# Patient Record
Sex: MALE | Race: WHITE | NOT HISPANIC OR LATINO | Employment: FULL TIME | URBAN - METROPOLITAN AREA
[De-identification: names, ages, dates, MRNs, and addresses within clinical notes are randomized per-mention and may not be internally consistent; named-entity substitution may affect disease eponyms.]

---

## 2017-03-02 ENCOUNTER — HOSPITAL ENCOUNTER (EMERGENCY)
Facility: HOSPITAL | Age: 37
Discharge: HOME | End: 2017-03-02
Attending: EMERGENCY MEDICINE | Admitting: EMERGENCY MEDICINE
Payer: OTHER MISCELLANEOUS

## 2017-03-02 VITALS
TEMPERATURE: 98.8 F | HEIGHT: 68 IN | SYSTOLIC BLOOD PRESSURE: 131 MMHG | HEART RATE: 74 BPM | BODY MASS INDEX: 33.34 KG/M2 | DIASTOLIC BLOOD PRESSURE: 80 MMHG | WEIGHT: 220 LBS | RESPIRATION RATE: 16 BRPM | OXYGEN SATURATION: 96 %

## 2017-03-02 DIAGNOSIS — S61.019A THUMB LACERATION: Primary | ICD-10-CM

## 2017-03-02 PROCEDURE — 90715 TDAP VACCINE 7 YRS/> IM: CPT | Performed by: EMERGENCY MEDICINE

## 2017-03-02 PROCEDURE — 99282 EMERGENCY DEPT VISIT SF MDM: CPT

## 2017-03-02 PROCEDURE — 90471 IMMUNIZATION ADMIN: CPT

## 2017-03-02 RX ORDER — ACETAMINOPHEN 325 MG/1
650 TABLET ORAL ONCE
Status: COMPLETED | OUTPATIENT
Start: 2017-03-02 | End: 2017-03-02

## 2017-03-02 RX ORDER — HYDROCODONE BITARTRATE AND ACETAMINOPHEN 5; 325 MG/1; MG/1
1 TABLET ORAL EVERY 8 HOURS PRN
Qty: 3 TABLET | Refills: 0 | Status: SHIPPED | OUTPATIENT
Start: 2017-03-02 | End: 2017-03-05

## 2017-03-02 RX ORDER — DIAPER,BRIEF,INFANT-TODD,DISP
1 EACH MISCELLANEOUS 2 TIMES DAILY
Qty: 14 G | Refills: 0 | Status: SHIPPED | OUTPATIENT
Start: 2017-03-02 | End: 2017-04-17 | Stop reason: HOSPADM

## 2017-03-02 RX ORDER — GINSENG 100 MG
1 CAPSULE ORAL ONCE
Status: COMPLETED | OUTPATIENT
Start: 2017-03-02 | End: 2017-03-02

## 2017-03-02 RX ORDER — LIDOCAINE HYDROCHLORIDE 10 MG/ML
10 INJECTION, SOLUTION EPIDURAL; INFILTRATION; INTRACAUDAL; PERINEURAL ONCE
Status: COMPLETED | OUTPATIENT
Start: 2017-03-02 | End: 2017-03-02

## 2017-03-02 RX ADMIN — TETANUS TOXOID, REDUCED DIPHTHERIA TOXOID AND ACELLULAR PERTUSSIS VACCINE, ADSORBED 0.5 ML: 5; 2.5; 8; 8; 2.5 SUSPENSION INTRAMUSCULAR at 01:49

## 2017-03-02 RX ADMIN — BACITRACIN ZINC 1 SMALL APPLICATION: 500 OINTMENT TOPICAL at 01:49

## 2017-03-02 RX ADMIN — ACETAMINOPHEN 650 MG: 325 TABLET, FILM COATED ORAL at 01:48

## 2017-03-02 RX ADMIN — LIDOCAINE HYDROCHLORIDE 10 ML: 10 INJECTION, SOLUTION EPIDURAL; INFILTRATION; INTRACAUDAL; PERINEURAL at 01:49

## 2017-04-17 ENCOUNTER — ANESTHESIA (OUTPATIENT)
Dept: PERIOP | Facility: HOSPITAL | Age: 37
End: 2017-04-17
Payer: COMMERCIAL

## 2017-04-17 ENCOUNTER — ANESTHESIA EVENT (OUTPATIENT)
Dept: PERIOP | Facility: HOSPITAL | Age: 37
End: 2017-04-17
Payer: COMMERCIAL

## 2017-04-17 ENCOUNTER — APPOINTMENT (EMERGENCY)
Dept: CT IMAGING | Facility: HOSPITAL | Age: 37
End: 2017-04-17
Payer: COMMERCIAL

## 2017-04-17 ENCOUNTER — HOSPITAL ENCOUNTER (OUTPATIENT)
Facility: HOSPITAL | Age: 37
Setting detail: OBSERVATION
Discharge: HOME | End: 2017-04-17
Attending: EMERGENCY MEDICINE | Admitting: SURGERY
Payer: COMMERCIAL

## 2017-04-17 VITALS
HEIGHT: 68 IN | HEART RATE: 86 BPM | TEMPERATURE: 98.3 F | WEIGHT: 227 LBS | SYSTOLIC BLOOD PRESSURE: 120 MMHG | BODY MASS INDEX: 34.4 KG/M2 | OXYGEN SATURATION: 97 % | RESPIRATION RATE: 18 BRPM | DIASTOLIC BLOOD PRESSURE: 70 MMHG

## 2017-04-17 DIAGNOSIS — K37 APPENDICITIS: Primary | ICD-10-CM

## 2017-04-17 DIAGNOSIS — R10.31 RIGHT LOWER QUADRANT ABDOMINAL PAIN: ICD-10-CM

## 2017-04-17 PROBLEM — K35.80 APPENDICITIS, ACUTE: Status: ACTIVE | Noted: 2017-04-17

## 2017-04-17 LAB
ALBUMIN SERPL BCP-MCNC: 4.2 G/DL (ref 3.5–5)
ALP SERPL-CCNC: 119 U/L (ref 46–116)
ALT SERPL W P-5'-P-CCNC: 40 U/L (ref 12–78)
ANION GAP SERPL CALCULATED.3IONS-SCNC: 8 MMOL/L (ref 4–13)
APTT PPP: 28 SECONDS (ref 24–36)
AST SERPL W P-5'-P-CCNC: 15 U/L (ref 5–45)
ATRIAL RATE: 69 BPM
BACTERIA UR QL AUTO: NORMAL /HPF
BASOPHILS # BLD AUTO: 0.03 THOUSANDS/ΜL (ref 0–0.1)
BASOPHILS NFR BLD AUTO: 0 % (ref 0–1)
BILIRUB SERPL-MCNC: 0.4 MG/DL (ref 0.2–1)
BILIRUB UR QL STRIP: NEGATIVE
BUN SERPL-MCNC: 15 MG/DL (ref 5–25)
CALCIUM SERPL-MCNC: 9 MG/DL (ref 8.3–10.1)
CHLORIDE SERPL-SCNC: 103 MMOL/L (ref 100–108)
CLARITY UR: CLEAR
CO2 SERPL-SCNC: 29 MMOL/L (ref 21–32)
COLOR UR: YELLOW
CREAT SERPL-MCNC: 0.84 MG/DL (ref 0.6–1.3)
EOSINOPHIL # BLD AUTO: 0.16 THOUSAND/ΜL (ref 0–0.61)
EOSINOPHIL NFR BLD AUTO: 1 % (ref 0–6)
ERYTHROCYTE [DISTWIDTH] IN BLOOD BY AUTOMATED COUNT: 13.6 % (ref 11.6–15.1)
GFR SERPL CREATININE-BSD FRML MDRD: >60 ML/MIN/1.73SQ M
GLUCOSE SERPL-MCNC: 111 MG/DL (ref 65–140)
GLUCOSE UR STRIP-MCNC: NEGATIVE MG/DL
HCT VFR BLD AUTO: 45.7 % (ref 36.5–49.3)
HGB BLD-MCNC: 15.8 G/DL (ref 12–17)
HGB UR QL STRIP.AUTO: ABNORMAL
INR PPP: 1.04 (ref 0.86–1.16)
KETONES UR STRIP-MCNC: NEGATIVE MG/DL
LACTATE SERPL-SCNC: 1 MMOL/L (ref 0.5–2)
LEUKOCYTE ESTERASE UR QL STRIP: NEGATIVE
LIPASE SERPL-CCNC: 108 U/L (ref 73–393)
LYMPHOCYTES # BLD AUTO: 2.02 THOUSANDS/ΜL (ref 0.6–4.47)
LYMPHOCYTES NFR BLD AUTO: 17 % (ref 14–44)
MCH RBC QN AUTO: 28.7 PG (ref 26.8–34.3)
MCHC RBC AUTO-ENTMCNC: 34.6 G/DL (ref 31.4–37.4)
MCV RBC AUTO: 83 FL (ref 82–98)
MONOCYTES # BLD AUTO: 0.94 THOUSAND/ΜL (ref 0.17–1.22)
MONOCYTES NFR BLD AUTO: 8 % (ref 4–12)
NEUTROPHILS # BLD AUTO: 8.64 THOUSANDS/ΜL (ref 1.85–7.62)
NEUTS SEG NFR BLD AUTO: 74 % (ref 43–75)
NITRITE UR QL STRIP: NEGATIVE
NON-SQ EPI CELLS URNS QL MICRO: NORMAL /HPF
P AXIS: 18 DEGREES
PH UR STRIP.AUTO: 7 [PH] (ref 4.5–8)
PLATELET # BLD AUTO: 156 THOUSANDS/UL (ref 149–390)
PMV BLD AUTO: 11.1 FL (ref 8.9–12.7)
POTASSIUM SERPL-SCNC: 3.8 MMOL/L (ref 3.5–5.3)
PR INTERVAL: 150 MS
PROT SERPL-MCNC: 7.5 G/DL (ref 6.4–8.2)
PROT UR STRIP-MCNC: NEGATIVE MG/DL
PROTHROMBIN TIME: 13.4 SECONDS (ref 12–14.3)
QRS AXIS: -16 DEGREES
QRSD INTERVAL: 114 MS
QT INTERVAL: 390 MS
QTC INTERVAL: 409 MS
RBC # BLD AUTO: 5.5 MILLION/UL (ref 3.88–5.62)
RBC #/AREA URNS AUTO: NORMAL /HPF
SODIUM SERPL-SCNC: 140 MMOL/L (ref 136–145)
SP GR UR STRIP.AUTO: 1.01 (ref 1–1.03)
T WAVE AXIS: 11 DEGREES
UROBILINOGEN UR QL STRIP.AUTO: 1 E.U./DL
VENTRICULAR RATE: 66 BPM
WBC # BLD AUTO: 11.79 THOUSAND/UL (ref 4.31–10.16)
WBC #/AREA URNS AUTO: NORMAL /HPF

## 2017-04-17 PROCEDURE — 93005 ELECTROCARDIOGRAM TRACING: CPT | Performed by: EMERGENCY MEDICINE

## 2017-04-17 PROCEDURE — 85730 THROMBOPLASTIN TIME PARTIAL: CPT | Performed by: EMERGENCY MEDICINE

## 2017-04-17 PROCEDURE — 85610 PROTHROMBIN TIME: CPT | Performed by: EMERGENCY MEDICINE

## 2017-04-17 PROCEDURE — 74177 CT ABD & PELVIS W/CONTRAST: CPT

## 2017-04-17 PROCEDURE — 36415 COLL VENOUS BLD VENIPUNCTURE: CPT | Performed by: EMERGENCY MEDICINE

## 2017-04-17 PROCEDURE — 87086 URINE CULTURE/COLONY COUNT: CPT | Performed by: EMERGENCY MEDICINE

## 2017-04-17 PROCEDURE — 83605 ASSAY OF LACTIC ACID: CPT | Performed by: EMERGENCY MEDICINE

## 2017-04-17 PROCEDURE — 96375 TX/PRO/DX INJ NEW DRUG ADDON: CPT

## 2017-04-17 PROCEDURE — 85025 COMPLETE CBC W/AUTO DIFF WBC: CPT | Performed by: EMERGENCY MEDICINE

## 2017-04-17 PROCEDURE — 99285 EMERGENCY DEPT VISIT HI MDM: CPT

## 2017-04-17 PROCEDURE — 80053 COMPREHEN METABOLIC PANEL: CPT | Performed by: EMERGENCY MEDICINE

## 2017-04-17 PROCEDURE — 81001 URINALYSIS AUTO W/SCOPE: CPT | Performed by: EMERGENCY MEDICINE

## 2017-04-17 PROCEDURE — 96374 THER/PROPH/DIAG INJ IV PUSH: CPT

## 2017-04-17 PROCEDURE — 88304 TISSUE EXAM BY PATHOLOGIST: CPT | Performed by: SURGERY

## 2017-04-17 PROCEDURE — 83690 ASSAY OF LIPASE: CPT | Performed by: EMERGENCY MEDICINE

## 2017-04-17 PROCEDURE — 96361 HYDRATE IV INFUSION ADD-ON: CPT

## 2017-04-17 RX ORDER — LIDOCAINE HYDROCHLORIDE 10 MG/ML
INJECTION, SOLUTION INFILTRATION; PERINEURAL AS NEEDED
Status: DISCONTINUED | OUTPATIENT
Start: 2017-04-17 | End: 2017-04-17 | Stop reason: SURG

## 2017-04-17 RX ORDER — PROPOFOL 10 MG/ML
INJECTION, EMULSION INTRAVENOUS AS NEEDED
Status: DISCONTINUED | OUTPATIENT
Start: 2017-04-17 | End: 2017-04-17 | Stop reason: SURG

## 2017-04-17 RX ORDER — ONDANSETRON 2 MG/ML
4 INJECTION INTRAMUSCULAR; INTRAVENOUS ONCE
Status: COMPLETED | OUTPATIENT
Start: 2017-04-17 | End: 2017-04-17

## 2017-04-17 RX ORDER — ONDANSETRON 2 MG/ML
INJECTION INTRAMUSCULAR; INTRAVENOUS AS NEEDED
Status: DISCONTINUED | OUTPATIENT
Start: 2017-04-17 | End: 2017-04-17 | Stop reason: SURG

## 2017-04-17 RX ORDER — ACETAMINOPHEN 325 MG/1
650 TABLET ORAL EVERY 4 HOURS PRN
Status: DISCONTINUED | OUTPATIENT
Start: 2017-04-17 | End: 2017-04-17 | Stop reason: HOSPADM

## 2017-04-17 RX ORDER — OXYCODONE HYDROCHLORIDE AND ACETAMINOPHEN 5; 325 MG/1; MG/1
2 TABLET ORAL EVERY 4 HOURS PRN
Qty: 28 TABLET | Refills: 0 | Status: SHIPPED | OUTPATIENT
Start: 2017-04-17 | End: 2019-12-31 | Stop reason: ALTCHOICE

## 2017-04-17 RX ORDER — MIDAZOLAM HYDROCHLORIDE 1 MG/ML
INJECTION INTRAMUSCULAR; INTRAVENOUS
Status: COMPLETED
Start: 2017-04-17 | End: 2017-04-17

## 2017-04-17 RX ORDER — SODIUM CHLORIDE, SODIUM LACTATE, POTASSIUM CHLORIDE, CALCIUM CHLORIDE 600; 310; 30; 20 MG/100ML; MG/100ML; MG/100ML; MG/100ML
125 INJECTION, SOLUTION INTRAVENOUS CONTINUOUS
Status: DISCONTINUED | OUTPATIENT
Start: 2017-04-17 | End: 2017-04-17 | Stop reason: SDUPTHER

## 2017-04-17 RX ORDER — GLYCOPYRROLATE 0.2 MG/ML
INJECTION INTRAMUSCULAR; INTRAVENOUS AS NEEDED
Status: DISCONTINUED | OUTPATIENT
Start: 2017-04-17 | End: 2017-04-17 | Stop reason: SURG

## 2017-04-17 RX ORDER — KETOROLAC TROMETHAMINE 30 MG/ML
INJECTION, SOLUTION INTRAMUSCULAR; INTRAVENOUS AS NEEDED
Status: DISCONTINUED | OUTPATIENT
Start: 2017-04-17 | End: 2017-04-17 | Stop reason: SURG

## 2017-04-17 RX ORDER — ONDANSETRON 2 MG/ML
4 INJECTION INTRAMUSCULAR; INTRAVENOUS ONCE
Status: DISCONTINUED | OUTPATIENT
Start: 2017-04-17 | End: 2017-04-17 | Stop reason: HOSPADM

## 2017-04-17 RX ORDER — OXYCODONE HYDROCHLORIDE AND ACETAMINOPHEN 5; 325 MG/1; MG/1
2 TABLET ORAL EVERY 4 HOURS PRN
Status: DISCONTINUED | OUTPATIENT
Start: 2017-04-17 | End: 2017-04-17 | Stop reason: HOSPADM

## 2017-04-17 RX ORDER — CEFAZOLIN SODIUM 1 G/3ML
INJECTION, POWDER, FOR SOLUTION INTRAMUSCULAR; INTRAVENOUS AS NEEDED
Status: DISCONTINUED | OUTPATIENT
Start: 2017-04-17 | End: 2017-04-17 | Stop reason: SURG

## 2017-04-17 RX ORDER — ROCURONIUM BROMIDE 10 MG/ML
INJECTION, SOLUTION INTRAVENOUS AS NEEDED
Status: DISCONTINUED | OUTPATIENT
Start: 2017-04-17 | End: 2017-04-17 | Stop reason: SURG

## 2017-04-17 RX ORDER — FENTANYL CITRATE/PF 50 MCG/ML
25 SYRINGE (ML) INJECTION
Status: DISCONTINUED | OUTPATIENT
Start: 2017-04-17 | End: 2017-04-17 | Stop reason: HOSPADM

## 2017-04-17 RX ORDER — SODIUM CHLORIDE, SODIUM LACTATE, POTASSIUM CHLORIDE, CALCIUM CHLORIDE 600; 310; 30; 20 MG/100ML; MG/100ML; MG/100ML; MG/100ML
125 INJECTION, SOLUTION INTRAVENOUS CONTINUOUS
Status: DISCONTINUED | OUTPATIENT
Start: 2017-04-17 | End: 2017-04-17 | Stop reason: HOSPADM

## 2017-04-17 RX ORDER — FENTANYL CITRATE 50 UG/ML
INJECTION, SOLUTION INTRAMUSCULAR; INTRAVENOUS
Status: COMPLETED
Start: 2017-04-17 | End: 2017-04-17

## 2017-04-17 RX ORDER — OXYCODONE HYDROCHLORIDE AND ACETAMINOPHEN 5; 325 MG/1; MG/1
1 TABLET ORAL EVERY 4 HOURS PRN
Status: DISCONTINUED | OUTPATIENT
Start: 2017-04-17 | End: 2017-04-17

## 2017-04-17 RX ORDER — ONDANSETRON 2 MG/ML
4 INJECTION INTRAMUSCULAR; INTRAVENOUS EVERY 4 HOURS PRN
Status: DISCONTINUED | OUTPATIENT
Start: 2017-04-17 | End: 2017-04-17 | Stop reason: SDUPTHER

## 2017-04-17 RX ORDER — BUPIVACAINE HYDROCHLORIDE AND EPINEPHRINE 2.5; 5 MG/ML; UG/ML
INJECTION, SOLUTION EPIDURAL; INFILTRATION; INTRACAUDAL; PERINEURAL AS NEEDED
Status: DISCONTINUED | OUTPATIENT
Start: 2017-04-17 | End: 2017-04-17 | Stop reason: HOSPADM

## 2017-04-17 RX ORDER — SODIUM CHLORIDE 9 MG/ML
125 INJECTION, SOLUTION INTRAVENOUS CONTINUOUS
Status: DISCONTINUED | OUTPATIENT
Start: 2017-04-17 | End: 2017-04-17

## 2017-04-17 RX ORDER — SUCCINYLCHOLINE/SOD CL,ISO/PF 100 MG/5ML
SYRINGE (ML) INTRAVENOUS AS NEEDED
Status: DISCONTINUED | OUTPATIENT
Start: 2017-04-17 | End: 2017-04-17 | Stop reason: SURG

## 2017-04-17 RX ORDER — ONDANSETRON 2 MG/ML
4 INJECTION INTRAMUSCULAR; INTRAVENOUS EVERY 4 HOURS PRN
Status: DISCONTINUED | OUTPATIENT
Start: 2017-04-17 | End: 2017-04-17 | Stop reason: HOSPADM

## 2017-04-17 RX ADMIN — CEFAZOLIN SODIUM 1000 MG: 1 SOLUTION INTRAVENOUS at 12:16

## 2017-04-17 RX ADMIN — DEXAMETHASONE SODIUM PHOSPHATE 8 MG: 10 INJECTION INTRAMUSCULAR; INTRAVENOUS at 08:46

## 2017-04-17 RX ADMIN — METRONIDAZOLE 500 MG: 500 INJECTION, SOLUTION INTRAVENOUS at 11:20

## 2017-04-17 RX ADMIN — MIDAZOLAM HYDROCHLORIDE 2 MG: 1 INJECTION, SOLUTION INTRAMUSCULAR; INTRAVENOUS at 08:38

## 2017-04-17 RX ADMIN — IOHEXOL 100 ML: 350 INJECTION, SOLUTION INTRAVENOUS at 03:00

## 2017-04-17 RX ADMIN — LIDOCAINE HYDROCHLORIDE 50 MG: 10 INJECTION, SOLUTION INFILTRATION; PERINEURAL at 08:41

## 2017-04-17 RX ADMIN — GLYCOPYRROLATE 0.8 MG: 0.2 INJECTION INTRAMUSCULAR; INTRAVENOUS at 09:14

## 2017-04-17 RX ADMIN — FAMOTIDINE 20 MG: 10 INJECTION, SOLUTION INTRAVENOUS at 01:41

## 2017-04-17 RX ADMIN — Medication 180 MG: at 08:40

## 2017-04-17 RX ADMIN — ROCURONIUM BROMIDE 30 MG: 10 INJECTION, SOLUTION INTRAVENOUS at 08:52

## 2017-04-17 RX ADMIN — HYDROMORPHONE HYDROCHLORIDE 1 MG: 1 INJECTION, SOLUTION INTRAMUSCULAR; INTRAVENOUS; SUBCUTANEOUS at 05:33

## 2017-04-17 RX ADMIN — CEFAZOLIN SODIUM 2000 MG: 1 POWDER, FOR SOLUTION INTRAMUSCULAR; INTRAVENOUS at 08:47

## 2017-04-17 RX ADMIN — SODIUM CHLORIDE 1000 ML: 0.9 INJECTION, SOLUTION INTRAVENOUS at 01:35

## 2017-04-17 RX ADMIN — SODIUM CHLORIDE, SODIUM LACTATE, POTASSIUM CHLORIDE, AND CALCIUM CHLORIDE 125 ML/HR: .6; .31; .03; .02 INJECTION, SOLUTION INTRAVENOUS at 04:20

## 2017-04-17 RX ADMIN — METRONIDAZOLE 500 MG: 500 INJECTION, SOLUTION INTRAVENOUS at 04:23

## 2017-04-17 RX ADMIN — NEOSTIGMINE METHYLSULFATE 4 MG: 1 INJECTION INTRAMUSCULAR; INTRAVENOUS; SUBCUTANEOUS at 09:14

## 2017-04-17 RX ADMIN — ONDANSETRON 4 MG: 2 INJECTION INTRAMUSCULAR; INTRAVENOUS at 01:36

## 2017-04-17 RX ADMIN — METRONIDAZOLE 500 MG: 500 SOLUTION INTRAVENOUS at 08:45

## 2017-04-17 RX ADMIN — PROPOFOL 200 MG: 10 INJECTION, EMULSION INTRAVENOUS at 08:41

## 2017-04-17 RX ADMIN — SODIUM CHLORIDE, SODIUM LACTATE, POTASSIUM CHLORIDE, AND CALCIUM CHLORIDE: .6; .31; .03; .02 INJECTION, SOLUTION INTRAVENOUS at 08:38

## 2017-04-17 RX ADMIN — CEFAZOLIN SODIUM 1000 MG: 1 SOLUTION INTRAVENOUS at 05:33

## 2017-04-17 RX ADMIN — KETOROLAC TROMETHAMINE 30 MG: 30 INJECTION, SOLUTION INTRAMUSCULAR at 09:12

## 2017-04-17 RX ADMIN — OXYCODONE HYDROCHLORIDE AND ACETAMINOPHEN 2 TABLET: 5; 325 TABLET ORAL at 14:38

## 2017-04-17 RX ADMIN — ONDANSETRON 4 MG: 2 INJECTION INTRAMUSCULAR; INTRAVENOUS at 08:46

## 2017-04-17 RX ADMIN — HYDROMORPHONE HYDROCHLORIDE 0.5 MG: 1 INJECTION, SOLUTION INTRAMUSCULAR; INTRAVENOUS; SUBCUTANEOUS at 01:39

## 2017-04-17 RX ADMIN — IOHEXOL 50 ML: 240 INJECTION, SOLUTION INTRATHECAL; INTRAVASCULAR; INTRAVENOUS; ORAL at 01:15

## 2017-04-17 RX ADMIN — FENTANYL CITRATE 100 MCG: 50 INJECTION INTRAMUSCULAR; INTRAVENOUS at 08:38

## 2017-04-18 LAB — BACTERIA UR CULT: NORMAL

## 2018-05-01 ENCOUNTER — HOSPITAL ENCOUNTER (EMERGENCY)
Facility: HOSPITAL | Age: 38
Discharge: HOME | End: 2018-05-02
Attending: EMERGENCY MEDICINE | Admitting: EMERGENCY MEDICINE
Payer: OTHER MISCELLANEOUS

## 2018-05-01 ENCOUNTER — APPOINTMENT (EMERGENCY)
Dept: RADIOLOGY | Facility: HOSPITAL | Age: 38
End: 2018-05-01
Payer: OTHER MISCELLANEOUS

## 2018-05-01 VITALS
DIASTOLIC BLOOD PRESSURE: 86 MMHG | BODY MASS INDEX: 33.45 KG/M2 | SYSTOLIC BLOOD PRESSURE: 139 MMHG | TEMPERATURE: 98.1 F | WEIGHT: 220 LBS | RESPIRATION RATE: 18 BRPM | OXYGEN SATURATION: 97 % | HEART RATE: 65 BPM

## 2018-05-01 DIAGNOSIS — M79.641 RIGHT HAND PAIN: ICD-10-CM

## 2018-05-01 DIAGNOSIS — S60.511A ABRASION OF RIGHT HAND, INITIAL ENCOUNTER: ICD-10-CM

## 2018-05-01 DIAGNOSIS — S69.91XA HAND INJURY, RIGHT, INITIAL ENCOUNTER: Primary | ICD-10-CM

## 2018-05-01 PROCEDURE — 73110 X-RAY EXAM OF WRIST: CPT

## 2018-05-01 PROCEDURE — 73130 X-RAY EXAM OF HAND: CPT

## 2018-05-01 RX ORDER — NAPROXEN 500 MG/1
500 TABLET ORAL ONCE
Status: COMPLETED | OUTPATIENT
Start: 2018-05-02 | End: 2018-05-02

## 2018-05-01 RX ORDER — GINSENG 100 MG
1 CAPSULE ORAL ONCE
Status: COMPLETED | OUTPATIENT
Start: 2018-05-02 | End: 2018-05-02

## 2018-05-02 PROCEDURE — 99283 EMERGENCY DEPT VISIT LOW MDM: CPT

## 2018-05-02 RX ORDER — BACITRACIN, NEOMYCIN, POLYMYXIN B 400; 3.5; 5 [USP'U]/G; MG/G; [USP'U]/G
OINTMENT TOPICAL 2 TIMES DAILY
Qty: 15 G | Refills: 0 | Status: SHIPPED | OUTPATIENT
Start: 2018-05-02 | End: 2019-12-31 | Stop reason: ALTCHOICE

## 2018-05-02 RX ORDER — NAPROXEN 500 MG/1
500 TABLET ORAL 2 TIMES DAILY WITH MEALS
Qty: 20 TABLET | Refills: 0 | Status: SHIPPED | OUTPATIENT
Start: 2018-05-02 | End: 2019-12-31 | Stop reason: ALTCHOICE

## 2018-05-02 RX ADMIN — NAPROXEN 500 MG: 500 TABLET ORAL at 00:24

## 2018-05-02 RX ADMIN — BACITRACIN ZINC 1 SMALL APPLICATION: 500 OINTMENT TOPICAL at 00:24

## 2018-05-02 NOTE — DISCHARGE INSTRUCTIONS
Please take a list of all of your medications and discharge paperwork with you to all of your follow-up medical visits  Please take all of your medications as directed  Please call your family doctor or return to the ER if you have increased shortness of breath, chest pain, fevers, chills, nausea, vomiting, diarrhea, or any other worsening symptoms  Arthralgia   WHAT YOU NEED TO KNOW:   Arthralgia is pain in one or more joints, with no inflammation  It may be short-term and get better within 6 to 8 weeks  Arthralgia can be an early sign of arthritis  Arthralgia may be caused by a medical condition, such as a hormone disorder or a tumor  It may also be caused by an infection or injury  DISCHARGE INSTRUCTIONS:   Medicines: The following medicines may  be ordered for you:  · Acetaminophen  decreases pain  Ask how much to take and how often to take it  Follow directions  Acetaminophen can cause liver damage if not taken correctly  · NSAIDs  decrease pain and prevent swelling  Ask your healthcare provider which medicine is right for you  Ask how much to take and when to take it  Take as directed  NSAIDs can cause stomach bleeding and kidney problems if not taken correctly  · Pain relief cream  decreases pain  Use this cream as directed  · Take your medicine as directed  Contact your healthcare provider if you think your medicine is not helping or if you have side effects  Tell him of her if you are allergic to any medicine  Keep a list of the medicines, vitamins, and herbs you take  Include the amounts, and when and why you take them  Bring the list or the pill bottles to follow-up visits  Carry your medicine list with you in case of an emergency  Follow up with your healthcare provider or specialist as directed:  Write down your questions so you remember to ask them during your visits  Self-care:   · Apply heat  to help decrease pain  Use a heating pad or heat wrap   Apply heat for 20 to 30 minutes every 2 hours for as many days as directed  · Rest  as much as possible  Avoid activities that cause joint pain  · Apply ice  to help decrease swelling and pain  Ice may also help prevent tissue damage  Use an ice pack, or put crushed ice in a plastic bag  Cover it with a towel and place it on your painful joint for 15 to 20 minutes every hour or as directed  · Support  the joint with a brace or elastic wrap as directed  · Elevate  your joint above the level of your heart as often as you can to help decrease swelling and pain  Prop your painful joint on pillows or blankets to keep it elevated comfortably  · Lose weight  if you are overweight  Extra weight can put pressure on your joints and cause more pain  Ask your healthcare provider how much you should weigh  Ask him to help you create a weight loss plan  · Exercise  regularly to help improve joint movement and to decrease pain  Ask about the best exercise plan for you  Low-impact exercises can help take the pressure off your joints  Examples are walking, swimming, and water aerobics  Physical therapy:  A physical therapist teaches you exercises to help improve movement and strength, and to decrease pain  Ask your healthcare provider if physical therapy is right for you  Contact your healthcare provider or specialist if:   · You have a fever  · You continue to have joint pain that cannot be relieved with heat, ice, or medicine  · You have pain and inflammation around your joint  · You have questions or concerns about your condition or care  Return to the emergency department if:   · You have sudden, severe pain when you move your joint  · You have a fever and shaking chills  · You cannot move your joint  · You lose feeling on the side of your body where you have the painful joint    © 2017 [employer] Information is for End User's use only and may not be sold, redistributed or otherwise used for commercial purposes  All illustrations and images included in CareNotes® are the copyrighted property of [employer]  or [employer]  The above information is an  only  It is not intended as medical advice for individual conditions or treatments  Talk to your doctor, nurse or pharmacist before following any medical regimen to see if it is safe and effective for you  Abrasion   WHAT YOU NEED TO KNOW:   An abrasion is a scrape on your skin  It happens when your skin rubs against a rough surface  Some examples of an abrasion include rug burn, a skinned elbow, or road rash  Abrasions can be many shapes and sizes  The wound may hurt, bleed, bruise, or swell  DISCHARGE INSTRUCTIONS:   Return to the emergency department if:   · The bleeding does not stop after 10 minutes of firm pressure  · You cannot rinse one or more foreign objects out of your wound  · You have red streaks on your skin coming from your wound  Contact your healthcare provider if:   · You have a fever or chills  · Your abrasion is red, warm, swollen, or draining pus  · You have questions or concerns about your condition or care  Care for your abrasion:   · Wash your hands and dry them with a clean towel  · Press a clean cloth against your wound to stop any bleeding  · Rinse your wound with a lot of clean water  Do not use harsh soap, alcohol, or iodine solutions  · Use a clean, wet cloth to remove any objects, such as small pieces of rocks or dirt  · Rub antibiotic ointment on your wound  This may help prevent infection and help your wound heal     · Cover the wound with a non-stick bandage  Change the bandage daily, and if gets wet or dirty  Follow up with your healthcare provider as directed:  Write down your questions so you remember to ask them during your visits     © 2017 [employer] Information is for End User's use only and may not be sold, redistributed or otherwise used for commercial purposes  All illustrations and images included in CareNotes® are the copyrighted property of [employer]  or [employer]  The above information is an  only  It is not intended as medical advice for individual conditions or treatments  Talk to your doctor, nurse or pharmacist before following any medical regimen to see if it is safe and effective for you

## 2018-05-02 NOTE — ED PROVIDER NOTES
History  Chief Complaint   Patient presents with    Hand Injury     Pt states that his right hand was injured from a machiene while at work  50-year-old male presents to the ER for evaluation of work related right hand injury  Patient is right-hand dominant  Patient works as a   Patient states that earlier today he was lifting a crate of metal slabs when no one of the slabs fell out and brushed against the dorsal aspect of patient's right hand  Patient was able to remove his hand from the slab upon impact  Patient now has multiple abrasions to the left hand without any active bleeding sites or lacerations  Patient notes swelling to the dorsal aspect of his right hand  Patient denies any paresthesia or numbness to the right hand  History provided by:  Patient  Hand Injury   Associated symptoms: no back pain, no fatigue and no fever        Prior to Admission Medications   Prescriptions Last Dose Informant Patient Reported? Taking?   oxyCODONE-acetaminophen (PERCOCET) 5-325 mg per tablet   No No   Sig: Take 2 tablets by mouth every 4 (four) hours as needed for moderate pain for up to 20 doses Max Daily Amount: 12 tablets      Facility-Administered Medications: None       History reviewed  No pertinent past medical history  Past Surgical History:   Procedure Laterality Date    NO PAST SURGERIES      [place] LAP,APPENDECTOMY N/A 4/17/2017    Procedure: APPENDECTOMY LAPAROSCOPIC;  Surgeon: Sierra Metzger DO;  [facility];  Service: General       History reviewed  No pertinent family history  I have reviewed and agree with the history as documented  Social History   Substance Use Topics    Smoking status: Never Smoker    Smokeless tobacco: Never Used    Alcohol use 0 6 oz/week     1 Cans of beer per week      Comment: on weekends        Review of Systems   Constitutional: Negative for activity change, fatigue and fever     HENT: Negative for congestion, ear discharge and sore throat  Eyes: Negative for pain and redness  Respiratory: Negative for cough, chest tightness, shortness of breath and wheezing  Cardiovascular: Negative for chest pain  Gastrointestinal: Negative for abdominal pain, diarrhea, nausea and vomiting  Endocrine: Negative for cold intolerance  Genitourinary: Negative for dysuria and urgency  Musculoskeletal: Positive for arthralgias  Negative for back pain  Skin: Positive for wound  Neurological: Negative for dizziness, weakness and headaches  Psychiatric/Behavioral: Negative for agitation and behavioral problems  Physical Exam  ED Triage Vitals   Temperature Pulse Respirations Blood Pressure SpO2   05/01/18 2351 05/01/18 2345 05/01/18 2345 05/01/18 2345 05/01/18 2345   98 1 °F (36 7 °C) 65 18 139/86 97 %      Temp Source Heart Rate Source Patient Position - Orthostatic VS BP Location FiO2 (%)   05/01/18 2351 05/01/18 2345 05/01/18 2345 05/01/18 2345 --   Tympanic Monitor Sitting Right arm       Pain Score       05/01/18 2345       7           Orthostatic Vital Signs  Vitals:    05/01/18 2345   BP: 139/86   Pulse: 65   Patient Position - Orthostatic VS: Sitting       Physical Exam   Constitutional: He is oriented to person, place, and time  He appears well-developed and well-nourished  HENT:   Head: Normocephalic and atraumatic  Nose: Nose normal    Mouth/Throat: Oropharynx is clear and moist    Eyes: Conjunctivae and EOM are normal    Neck: Normal range of motion  Neck supple  Cardiovascular: Normal rate, regular rhythm and normal heart sounds  Pulmonary/Chest: Effort normal and breath sounds normal    Abdominal: Soft  Bowel sounds are normal  He exhibits no distension  There is no tenderness  Musculoskeletal: Normal range of motion  Pulses intact to bilateral upper extremities  Sensation intact to bilateral upper extremity  Range of motion of fingers on the right hand is intact    3 cm x 3 cm area of hematoma noted to the dorsal aspect of right hand that is tender to palpation  No other acute bony abnormalities noted  No tenderness noted to palpation of the right wrist    Neurological: He is alert and oriented to person, place, and time  Skin: Skin is warm  Multiple superficial abrasions noted to the dorsal and palmar aspect of right hand and wrist    Psychiatric: He has a normal mood and affect  His behavior is normal  Judgment and thought content normal    Nursing note and vitals reviewed  ED Medications  Medications   naproxen (NAPROSYN) tablet 500 mg (500 mg Oral Given 5/2/18 0024)   bacitracin topical ointment 1 small application (1 small application Topical Given 5/2/18 0024)       Diagnostic Studies  Results Reviewed     None                 XR wrist 3+ views RIGHT    (Results Pending)   XR hand 3+ views RIGHT    (Results Pending)              Procedures  Procedures       Phone Contacts  ED Phone Contact    ED Course                               MDM  Number of Diagnoses or Management Options  Abrasion of right hand, initial encounter: new and requires workup  Hand injury, right, initial encounter: new and requires workup  Right hand pain: new and requires workup  Diagnosis management comments: Obtain x-ray of right hand and wrist to rule out any acute bony abnormalities  Dress abrasions with bacitracin and gauze  Give naproxen for pain  Amount and/or Complexity of Data Reviewed  Tests in the radiology section of CPT®: ordered and reviewed  Tests in the medicine section of CPT®: ordered and reviewed    Risk of Complications, Morbidity, and/or Mortality  General comments: No acute bony abnormalities noted on x-ray of right hand  At this point patient's symptoms are most likely secondary to musculoskeletal strain/sprain  Patient's abrasions were dressed in the ER with bacitracin and gauze  Patient given thumb spica wrist brace for comfort  Patient is discharged home on p r n   NSAIDs and follow-up to workman's Comp as well as orthopedic surgery for further evaluation and management  Close return instructions given to return to the ER for any worsening symptoms  Patient agrees with discharge plan  Patient well appearing at time of discharge  Patient Progress  Patient progress: stable    CritCare Time    Disposition  Final diagnoses:   Hand injury, right, initial encounter   Right hand pain   Abrasion of right hand, initial encounter     Time reflects when diagnosis was documented in both MDM as applicable and the Disposition within this note     Time User Action Codes Description Comment    5/1/2018 11:59 PM Fabiola Enciso Add [S60 511A] Abrasion of right hand, initial encounter     5/2/2018 12:22 AM Ferdous, Juan Manuel Frame Add [S69 91XA] Hand injury, right, initial encounter     5/2/2018 12:22 AM Fabiola Enciso Add [M79 641] Right hand pain     5/2/2018 12:22 AM Fabiola Enciso Modify [S69 91XA] Hand injury, right, initial encounter     5/2/2018 12:22 AM Ferdous, Juna Manuel Frame Remove [S60 511A] Abrasion of right hand, initial encounter     5/2/2018 12:22 AM Ferdous, Juan Manuel Frame Add [S60 511A] Abrasion of right hand, initial encounter       ED Disposition     ED Disposition Condition Comment    Discharge  Lay Serrano discharge to home/self care  Condition at discharge: Good        Follow-up Information     Follow up With Specialties Details Why Sid Lawrence MD Orthopedic Surgery In 2 days  [address] [place]  [phone]          Please also follow up with her workman's comp doctor as directed by her jaw          Patient's Medications   Discharge Prescriptions    NAPROXEN (EC NAPROSYN) 500 MG EC TABLET    Take 1 tablet (500 mg total) by mouth 2 (two) times a day with meals       Start Date: 5/2/2018  End Date: --       Order Dose: 500 mg       Quantity: 20 tablet    Refills: 0    NEOMYCIN-BACITRACIN-POLYMYXIN B (NEOSPORIN) OINTMENT Apply topically 2 (two) times a day       Start Date: 5/2/2018  End Date: --       Order Dose: --       Quantity: 15 g    Refills: 0     No discharge procedures on file      ED Provider  Electronically Signed by           Macie Estevez DO  05/02/18 6576

## 2019-11-21 ENCOUNTER — APPOINTMENT (EMERGENCY)
Dept: RADIOLOGY | Facility: HOSPITAL | Age: 39
End: 2019-11-21
Payer: COMMERCIAL

## 2019-11-21 ENCOUNTER — HOSPITAL ENCOUNTER (EMERGENCY)
Facility: HOSPITAL | Age: 39
Discharge: HOME | End: 2019-11-21
Attending: EMERGENCY MEDICINE | Admitting: EMERGENCY MEDICINE
Payer: COMMERCIAL

## 2019-11-21 ENCOUNTER — TELEPHONE (OUTPATIENT)
Dept: PHYSICAL THERAPY | Facility: OTHER | Age: 39
End: 2019-11-21

## 2019-11-21 VITALS
BODY MASS INDEX: 34.53 KG/M2 | OXYGEN SATURATION: 98 % | SYSTOLIC BLOOD PRESSURE: 141 MMHG | TEMPERATURE: 97.7 F | HEART RATE: 57 BPM | DIASTOLIC BLOOD PRESSURE: 90 MMHG | WEIGHT: 227.07 LBS | RESPIRATION RATE: 18 BRPM

## 2019-11-21 DIAGNOSIS — M54.9 MUSCULOSKELETAL BACK PAIN: Primary | ICD-10-CM

## 2019-11-21 PROCEDURE — 99283 EMERGENCY DEPT VISIT LOW MDM: CPT | Performed by: EMERGENCY MEDICINE

## 2019-11-21 PROCEDURE — 71046 X-RAY EXAM CHEST 2 VIEWS: CPT

## 2019-11-21 PROCEDURE — 99283 EMERGENCY DEPT VISIT LOW MDM: CPT

## 2019-11-21 RX ORDER — NAPROXEN 500 MG/1
500 TABLET ORAL 2 TIMES DAILY PRN
Qty: 20 TABLET | Refills: 0 | Status: SHIPPED | OUTPATIENT
Start: 2019-11-21 | End: 2019-12-31 | Stop reason: ALTCHOICE

## 2019-11-21 RX ORDER — NAPROXEN 250 MG/1
500 TABLET ORAL ONCE
Status: COMPLETED | OUTPATIENT
Start: 2019-11-21 | End: 2019-11-21

## 2019-11-21 RX ADMIN — NAPROXEN 500 MG: 250 TABLET ORAL at 08:37

## 2019-11-21 NOTE — ED PROVIDER NOTES
History  Chief Complaint   Patient presents with    Back Pain     pt reports b/l upper back pain that started yesterday morning, denies injury or recent illness  worse with movement  History provided by:  Patient  Back Pain   Location:  Thoracic spine  Quality:  Aching  Radiates to:  Does not radiate  Pain severity:  Moderate  Pain is:  Same all the time  Onset quality:  Gradual  Duration:  2 days  Timing:  Constant  Progression:  Worsening  Chronicity:  New  Context comment:  No specific injury, 2 days ago woke up felt pain and upper back greatly worsened by motion  Patient works as a , denies any work related injury  Relieved by:  Being still and lying down  Exacerbated by: Pain triggered/worsened by movement particularly truncal rotation forward bending  Ineffective treatments: Tried 1 500 mg Tylenol pill this morning  Associated symptoms: no abdominal pain, no abdominal swelling, no bladder incontinence, no bowel incontinence, no chest pain, no dysuria, no fever, no headaches, no numbness and no paresthesias    Associated symptoms comment:  No pleurisy no shortness of breath no chest pain      Prior to Admission Medications   Prescriptions Last Dose Informant Patient Reported? Taking?   naproxen (EC NAPROSYN) 500 MG EC tablet   No No   Sig: Take 1 tablet (500 mg total) by mouth 2 (two) times a day with meals   neomycin-bacitracin-polymyxin b (NEOSPORIN) ointment   No No   Sig: Apply topically 2 (two) times a day   oxyCODONE-acetaminophen (PERCOCET) 5-325 mg per tablet   No No   Sig: Take 2 tablets by mouth every 4 (four) hours as needed for moderate pain for up to 20 doses Max Daily Amount: 12 tablets      Facility-Administered Medications: None       History reviewed  No pertinent past medical history      Past Surgical History:   Procedure Laterality Date    NO PAST SURGERIES      CT LAP,APPENDECTOMY N/A 4/17/2017    Procedure: APPENDECTOMY LAPAROSCOPIC;  Surgeon: Raul Billingsley, DO;  [facility];  Service: General       History reviewed  No pertinent family history  I have reviewed and agree with the history as documented  Social History     Tobacco Use    Smoking status: Never Smoker    Smokeless tobacco: Never Used   Substance Use Topics    Alcohol use: Yes     Alcohol/week: 1 0 standard drinks     Types: 1 Cans of beer per week     Comment: on weekends    Drug use: No        Review of Systems   Constitutional: Negative for activity change, chills, diaphoresis and fever  HENT: Negative for congestion, sinus pressure and sore throat  Eyes: Negative for pain and visual disturbance  Respiratory: Negative for cough, chest tightness, shortness of breath, wheezing and stridor  Cardiovascular: Negative for chest pain and palpitations  Gastrointestinal: Negative for abdominal distention, abdominal pain, bowel incontinence, constipation, diarrhea, nausea and vomiting  Genitourinary: Negative for bladder incontinence, dysuria and frequency  Musculoskeletal: Positive for back pain  Negative for neck pain and neck stiffness  Skin: Negative for rash  Neurological: Negative for dizziness, speech difficulty, light-headedness, numbness, headaches and paresthesias  Physical Exam  Physical Exam   Constitutional: He is oriented to person, place, and time  He appears well-developed  No distress  HENT:   Head: Normocephalic and atraumatic  Eyes: Pupils are equal, round, and reactive to light  Neck: Normal range of motion  Neck supple  No tracheal deviation present  Cardiovascular: Normal rate, regular rhythm, normal heart sounds and intact distal pulses  No murmur heard  Pulmonary/Chest: Effort normal and breath sounds normal  No stridor  No respiratory distress  Abdominal: Soft  He exhibits no distension  There is no tenderness  There is no rebound and no guarding  Musculoskeletal: Normal range of motion  He exhibits no tenderness or deformity  Upper back pain, tender over hypertonic bilateral thoracic paraspinal muscles  No rib tenderness  No spinous process tenderness to the cervical thoracic or lumbar spines  Neurological: He is alert and oriented to person, place, and time  Skin: Skin is warm and dry  He is not diaphoretic  No erythema  No pallor  Psychiatric: He has a normal mood and affect  Vitals reviewed  Vital Signs  ED Triage Vitals [11/21/19 0734]   Temperature Pulse Respirations Blood Pressure SpO2   97 7 °F (36 5 °C) 57 18 141/90 98 %      Temp Source Heart Rate Source Patient Position - Orthostatic VS BP Location FiO2 (%)   Oral Monitor -- -- --      Pain Score       --           Vitals:    11/21/19 0734   BP: 141/90   Pulse: 57         Visual Acuity      ED Medications  Medications - No data to display    Diagnostic Studies  Results Reviewed     None                 XR chest 2 views   ED Interpretation by Shaila Hamilton DO (11/21 0830)   No fracture no pneumothorax no acute cardiopulmonary pathology                 Procedures  Procedures       ED Course                               MDM  Number of Diagnoses or Management Options  Musculoskeletal back pain: new and requires workup  Diagnosis management comments: Denies history of upper or lower lower extremity weakness/numbness  Denies history of saddle anesthesia/perineal anesthesia  Denies bowel or bladder incontinence/retention  History does not suggest diagnosis of cauda equina syndrome  Patient denies history of IVDA, denies history of fevers, no recent surgeries or any procedures to suggest a transient bacteremia leading to a diagnosis of epidural abscess  Denies history of blood thinner use with recent history of lumbar puncture or any violation of the epidural space to suggest history of epidural hematoma  Therefore these above diagnoses (cauda equina syndrome, epidural abscess, epidural hematoma) were not pursued with diagnostic imaging            Amount and/or Complexity of Data Reviewed  Tests in the radiology section of CPT®: ordered and reviewed  Review and summarize past medical records: yes  Independent visualization of images, tracings, or specimens: yes        Disposition  Final diagnoses:   Musculoskeletal back pain     Time reflects when diagnosis was documented in both MDM as applicable and the Disposition within this note     Time User Action Codes Description Comment    11/21/2019  8:32 AM Lee Annmaricruz Lopezkal Add [M54 9] Musculoskeletal back pain       ED Disposition     ED Disposition Condition Date/Time Comment    Discharge Stable Thu Nov 21, 2019  8:32 AM Fortino Hernandez discharge to home/self care              Follow-up Information     Follow up With Specialties Details Why Contact Info Additional Information    [facility] Physical Therapy Call today To arrange for the next available appointment     [place] [address] Emergency Department Emergency Medicine Go to  If symptoms worsen [address] [address]  [phone]  ED,  [address] [place] [address], [phone]          Patient's Medications   Discharge Prescriptions    NAPROXEN (NAPROSYN) 500 MG TABLET    Take 1 tablet (500 mg total) by mouth 2 (two) times a day as needed for mild pain       Start Date: 11/21/2019End Date: --       Order Dose: 500 mg       Quantity: 20 tablet    Refills: 0         ED Provider  Electronically Signed by           Michele Jett DO  11/21/19 0512

## 2019-11-21 NOTE — ED NOTES
Pt seen, assessed and d/c by provider  Pt appeared to be in no acute distress upon discharge  Pt able to ambulate well without assistance upon exiting        Ulises Campos RN  11/21/19 5371

## 2019-11-21 NOTE — ED NOTES
Patient transported to [address] [place] [address], [address] [place] [address]  11/21/19 0602

## 2019-11-26 ENCOUNTER — TELEPHONE (OUTPATIENT)
Dept: PHYSICAL THERAPY | Facility: OTHER | Age: 39
End: 2019-11-26

## 2019-11-26 NOTE — TELEPHONE ENCOUNTER
Message left with Pt  to call Comp  Spine program, our c/b number and our hours given  This was second call placed to Pt  , waiting for c/b

## 2019-12-05 ENCOUNTER — TELEPHONE (OUTPATIENT)
Dept: PHYSICAL THERAPY | Facility: OTHER | Age: 39
End: 2019-12-05

## 2019-12-27 ENCOUNTER — HOSPITAL ENCOUNTER (EMERGENCY)
Facility: HOSPITAL | Age: 39
Discharge: HOME | End: 2019-12-27
Attending: EMERGENCY MEDICINE
Payer: COMMERCIAL

## 2019-12-27 VITALS
TEMPERATURE: 98.3 F | BODY MASS INDEX: 35.23 KG/M2 | OXYGEN SATURATION: 98 % | SYSTOLIC BLOOD PRESSURE: 128 MMHG | HEART RATE: 79 BPM | WEIGHT: 231.7 LBS | DIASTOLIC BLOOD PRESSURE: 90 MMHG | RESPIRATION RATE: 18 BRPM

## 2019-12-27 DIAGNOSIS — H01.006 BLEPHARITIS OF LEFT EYE: Primary | ICD-10-CM

## 2019-12-27 PROCEDURE — 99283 EMERGENCY DEPT VISIT LOW MDM: CPT

## 2019-12-27 PROCEDURE — 99283 EMERGENCY DEPT VISIT LOW MDM: CPT | Performed by: EMERGENCY MEDICINE

## 2019-12-27 NOTE — ED PROVIDER NOTES
History  Chief Complaint   Patient presents with    Eye Problem     pt c/o left eye swelling, redness and drainage  when he woke up it was shut and difficult to open     Patient presents emergency room with a 3 day history of redness and swelling of his left upper eyelid  He complains of some localized irritation  He has no visual complaints of double or blurred vision  He denies any fever chills  He is able to move his eyes in directions that he any discomfort  He has not been recently ill with upper respiratory symptoms  History provided by:  Patient  Eye Problem   Location:  Left eye  Quality:  Aching  Severity:  Mild  Onset quality:  Gradual  Duration:  3 days  Timing:  Constant  Progression:  Waxing and waning  Chronicity:  New  Context: not burn, not chemical exposure, not contact lens problem, not direct trauma, not foreign body, not using machinery, not scratch, not smoke exposure and not UV exposure    Relieved by:  None tried  Worsened by:  Contact  Ineffective treatments:  None tried  Associated symptoms: inflammation and redness    Associated symptoms: no blurred vision, no crusting, no decreased vision, no discharge, no double vision, no facial rash, no headaches, no itching, no nausea, no numbness, no photophobia, no scotomas, no swelling, no tearing, no tingling, no vomiting and no weakness    Risk factors: no conjunctival hemorrhage, no exposure to pinkeye, no previous injury to eye, no recent herpes zoster and no recent URI        Prior to Admission Medications   Prescriptions Last Dose Informant Patient Reported?  Taking?   naproxen (EC NAPROSYN) 500 MG EC tablet   No No   Sig: Take 1 tablet (500 mg total) by mouth 2 (two) times a day with meals   naproxen (NAPROSYN) 500 mg tablet   No No   Sig: Take 1 tablet (500 mg total) by mouth 2 (two) times a day as needed for mild pain   neomycin-bacitracin-polymyxin b (NEOSPORIN) ointment   No No   Sig: Apply topically 2 (two) times a day oxyCODONE-acetaminophen (PERCOCET) 5-325 mg per tablet   No No   Sig: Take 2 tablets by mouth every 4 (four) hours as needed for moderate pain for up to 20 doses Max Daily Amount: 12 tablets      Facility-Administered Medications: None       History reviewed  No pertinent past medical history  Past Surgical History:   Procedure Laterality Date    NO PAST SURGERIES      [place] LAP,APPENDECTOMY N/A 4/17/2017    Procedure: APPENDECTOMY LAPAROSCOPIC;  Surgeon: Kat Castro DO;  [facility];  Service: General       History reviewed  No pertinent family history  I have reviewed and agree with the history as documented  Social History     Tobacco Use    Smoking status: Never Smoker    Smokeless tobacco: Never Used   Substance Use Topics    Alcohol use: Yes     Alcohol/week: 1 0 standard drinks     Types: 1 Cans of beer per week     Comment: on weekends    Drug use: No        Review of Systems   Constitutional: Negative for activity change, appetite change, chills and fever  Eyes: Positive for redness  Negative for blurred vision, double vision, photophobia, pain, discharge, itching and visual disturbance  Gastrointestinal: Negative for nausea and vomiting  Skin: Positive for color change and rash  Negative for wound  Neurological: Negative for tingling, weakness, numbness and headaches  Psychiatric/Behavioral: Negative for confusion  All other systems reviewed and are negative  Physical Exam  Physical Exam   Constitutional: He is oriented to person, place, and time  He appears well-developed and well-nourished  No distress  HENT:   Head: Normocephalic and atraumatic  Right Ear: External ear normal    Left Ear: External ear normal    Nose: Nose normal    Mouth/Throat: Oropharynx is clear and moist    Eyes: Pupils are equal, round, and reactive to light  EOM are normal    Edema with some mild erythema present over the left upper eyelid  Mild tenderness upon palpation  Conjunctivae are normal   There is no active drainage present  Neck: Neck supple  No JVD present  No tracheal deviation present  No thyromegaly present  Cardiovascular: Normal rate, regular rhythm and normal heart sounds  Exam reveals no gallop and no friction rub  No murmur heard  Pulmonary/Chest: Effort normal and breath sounds normal  No stridor  No respiratory distress  He has no wheezes  He has no rales  Musculoskeletal: Normal range of motion  Lymphadenopathy:     He has no cervical adenopathy  Neurological: He is alert and oriented to person, place, and time  Skin: Skin is warm  Capillary refill takes less than 2 seconds  He is not diaphoretic  Psychiatric: He has a normal mood and affect  His behavior is normal  Judgment and thought content normal    Nursing note and vitals reviewed        Vital Signs  ED Triage Vitals [12/27/19 0714]   Temperature Pulse Respirations Blood Pressure SpO2   98 3 °F (36 8 °C) 79 18 128/90 98 %      Temp Source Heart Rate Source Patient Position - Orthostatic VS BP Location FiO2 (%)   Oral Monitor Sitting Right arm --      Pain Score       No Pain           Vitals:    12/27/19 0714   BP: 128/90   Pulse: 79   Patient Position - Orthostatic VS: Sitting         Visual Acuity  Visual Acuity      Most Recent Value   Visual acuity R eye is  20/40   Visual acuity Left eye is  20/30   Visual acuity in both eyes is  20/25   No corrective eyewear/lenses  Yes          ED Medications  Medications - No data to display    Diagnostic Studies  Results Reviewed     None                 No orders to display              Procedures  Procedures         ED Course                               MDM  Number of Diagnoses or Management Options  Blepharitis of left eye: new and does not require workup  Risk of Complications, Morbidity, and/or Mortality  Presenting problems: low  Diagnostic procedures: low  Management options: low  General comments: Patient presents emergency room with a 3 day history of redness and swelling of his left upper eyelid  He denies any trauma  Denies any recent illnesses  He was seen and examined  He was diagnosed with a blepharitis  He was discharged home with a prescription for gentamicin ophthalmic ointment to apply topically to the area 3 times a day for 5 days  He will apply warm compress to the area as well for 20 minutes 3 to 4 times a day  Should his symptoms worsen, he will return to the emergency room  He will follow up with his family physician as needed  Patient Progress  Patient progress: stable        Disposition  Final diagnoses:   Blepharitis of left eye     Time reflects when diagnosis was documented in both MDM as applicable and the Disposition within this note     Time User Action Codes Description Comment    12/27/2019  8:19 AM Maria Elena Lackey Add [E53 [id number]] Blepharitis of left eye       ED Disposition     ED Disposition Condition Date/Time Comment    Discharge Stable Fri Dec 27, 2019  8:18 AM Teresa Ying discharge to home/self care              Follow-up Information     Follow up With Specialties Details Why Contact Info    Marina Aguirre MD Family Medicine Schedule an appointment as soon as possible for a visit  As needed [address] [place] [address]   [phone]            Discharge Medication List as of 12/27/2019  8:22 AM      START taking these medications    Details   gentamicin (GENTAK) 0 3 % ophthalmic ointment Administer 0 5 inches into the left eye 3 (three) times a day for 5 days APPLY OINTMENT TO LEFT EYELID, Starting Fri 12/27/2019, Until Wed 1/1/2020, Normal         CONTINUE these medications which have NOT CHANGED    Details   naproxen (EC NAPROSYN) 500 MG EC tablet Take 1 tablet (500 mg total) by mouth 2 (two) times a day with meals, Starting Wed 5/2/2018, Print      naproxen (NAPROSYN) 500 mg tablet Take 1 tablet (500 mg total) by mouth 2 (two) times a day as needed for mild pain, Starting Thu 11/21/2019, Normal      neomycin-bacitracin-polymyxin b (NEOSPORIN) ointment Apply topically 2 (two) times a day, Starting Wed 5/2/2018, Print      oxyCODONE-acetaminophen (PERCOCET) 5-325 mg per tablet Take 2 tablets by mouth every 4 (four) hours as needed for moderate pain for up to 20 doses Max Daily Amount: 12 tablets, Starting 4/17/2017, Until Discontinued, Print           No discharge procedures on file      ED Provider  Electronically Signed by           Vero Miranda PA-C  12/27/19 0920       Elfego Negron MD  01/02/20 [id number]

## 2019-12-31 ENCOUNTER — HOSPITAL ENCOUNTER (EMERGENCY)
Facility: HOSPITAL | Age: 39
Discharge: HOME | End: 2019-12-31
Attending: EMERGENCY MEDICINE
Payer: COMMERCIAL

## 2019-12-31 VITALS
WEIGHT: 231 LBS | RESPIRATION RATE: 16 BRPM | SYSTOLIC BLOOD PRESSURE: 140 MMHG | DIASTOLIC BLOOD PRESSURE: 80 MMHG | OXYGEN SATURATION: 97 % | TEMPERATURE: 96.8 F | BODY MASS INDEX: 35.12 KG/M2 | HEART RATE: 67 BPM

## 2019-12-31 DIAGNOSIS — H00.019 HORDEOLUM EXTERNUM (STYE): Primary | ICD-10-CM

## 2019-12-31 PROCEDURE — 99283 EMERGENCY DEPT VISIT LOW MDM: CPT

## 2019-12-31 RX ORDER — CEPHALEXIN 500 MG/1
500 CAPSULE ORAL EVERY 12 HOURS SCHEDULED
Qty: 14 CAPSULE | Refills: 0 | Status: SHIPPED | OUTPATIENT
Start: 2019-12-31 | End: 2020-01-07

## 2019-12-31 NOTE — ED PROVIDER NOTES
History  Chief Complaint   Patient presents with    Blepharitis     Pt reports left upper eyelid swelling since before Christmas  Pt was seen 12/27 at [facility] and given Gentak ointment, however pt states its not helping  46year old male presents with L upper eyelid swelling x1 week  He has been having L eyelid swelling for several weeks  He feels like there is a pebble in his L upper eyelid  He has been using the gentamicin ointment with minimal relief  He has not been using warm compresses often  This has never happened to him before  No pain with eye movement  No foreign body sensation in eye  No pain with eye movement  No eye tearing  No other facial swelling  No visual disturbances  No blurry or double vision  No cold symptoms  No trauma or falls  Prior to Admission Medications   Prescriptions Last Dose Informant Patient Reported? Taking? gentamicin (GENTAK) 0 3 % ophthalmic ointment   No No   Sig: Administer 0 5 inches into the left eye 3 (three) times a day for 5 days APPLY OINTMENT TO LEFT EYELID      Facility-Administered Medications: None       History reviewed  No pertinent past medical history  Past Surgical History:   Procedure Laterality Date    NO PAST SURGERIES      [place] LAP,APPENDECTOMY N/A 4/17/2017    Procedure: APPENDECTOMY LAPAROSCOPIC;  Surgeon: Carl Dance, DO;  [facility];  Service: General       History reviewed  No pertinent family history  I have reviewed and agree with the history as documented  Social History     Tobacco Use    Smoking status: Never Smoker    Smokeless tobacco: Never Used   Substance Use Topics    Alcohol use: Yes     Alcohol/week: 1 0 standard drinks     Types: 1 Cans of beer per week     Comment: on weekends    Drug use: No        Review of Systems   Constitutional: Negative for chills and fever  HENT: Negative for sneezing and sore throat      Eyes: Negative for photophobia, pain, discharge, redness, itching and visual disturbance  Eyelid pain   Respiratory: Negative for cough and shortness of breath  Cardiovascular: Negative for chest pain, palpitations and leg swelling  Gastrointestinal: Negative for abdominal pain, constipation, diarrhea, nausea and vomiting  Musculoskeletal: Negative for back pain, gait problem, joint swelling and myalgias  Skin: Negative for color change, pallor, rash and wound  Neurological: Negative for dizziness, syncope, weakness, light-headedness, numbness and headaches  All other systems reviewed and are negative  Physical Exam  Physical Exam   Constitutional: He appears well-developed and well-nourished  No distress  HENT:   Head: Normocephalic and atraumatic  Nose: Nose normal    Eyes: Pupils are equal, round, and reactive to light  Conjunctivae and EOM are normal  Lids are everted and swept, no foreign bodies found  Right eye exhibits no chemosis, no discharge, no exudate and no hordeolum  No foreign body present in the right eye  Left eye exhibits hordeolum  Left eye exhibits no chemosis, no discharge and no exudate  No foreign body present in the left eye  No scleral icterus  Neck: Normal range of motion  Cardiovascular: Normal rate, regular rhythm, normal heart sounds and intact distal pulses  Exam reveals no gallop and no friction rub  No murmur heard  Pulmonary/Chest: Effort normal and breath sounds normal  No stridor  No respiratory distress  He has no wheezes  He has no rales  Sp02 is 97% indicating adequate oxygenation on room air   Skin: Skin is warm and dry  Capillary refill takes less than 2 seconds  No rash noted  He is not diaphoretic  No erythema  No pallor  Nursing note and vitals reviewed        Vital Signs  ED Triage Vitals [12/31/19 1038]   Temperature Pulse Respirations Blood Pressure SpO2   (!) 96 8 °F (36 °C) 67 16 140/80 97 %      Temp Source Heart Rate Source Patient Position - Orthostatic VS BP Location FiO2 (%)   Tympanic Monitor Sitting Right arm --      Pain Score       2           Vitals:    12/31/19 1038   BP: 140/80   Pulse: 67   Patient Position - Orthostatic VS: Sitting         Visual Acuity  Visual Acuity      Most Recent Value   Visual acuity R eye is  20/30   Visual acuity Left eye is  20/40   Visual acuity in both eyes is  20/30   Wearing corrective eyewear/lenses? No          ED Medications  Medications - No data to display    Diagnostic Studies  Results Reviewed     None                 No orders to display              Procedures  Procedures         ED Course                               MDM  Number of Diagnoses or Management Options  Hordeolum externum (stye):   Diagnosis management comments: Patient well appearing, in no acute distress with L upper eyelid stye, no pain with eye movement  No relief with gentamicin after completing course recommended at prior visit  Explained warm compresses will help the most, will give oral abx as still no relief of symptoms  Given optho follow up  Gave patient proper education regarding diagnosis  Answered all questions  Return to ED for any worsening of symptoms otherwise follow up with primary care physician for re-evaluation  Discussed plan with patient who verbalized understanding and agreed to plan  Amount and/or Complexity of Data Reviewed  Review and summarize past medical records: yes  Discuss the patient with other providers: yes          Disposition  Final diagnoses:   Hordeolum externum (stye)     Time reflects when diagnosis was documented in both MDM as applicable and the Disposition within this note     Time User Action Codes Description Comment    12/31/2019 11:07 AM Zo Levi Add [[id number] [id number]] Hordeolum externum (stye)       ED Disposition     ED Disposition Condition Date/Time Comment    Discharge Stable Tue Dec 31, 2019 11:07 AM Marvin Mijares discharge to home/self care              Follow-up Information     Follow up With Specialties Details Why Contact Info Additional [address] Ophthalmology Call today to make follow up appointment if symptoms persist [address]  [address] Department Emergency Medicine Go to  As needed [address] [facility] ED, [facility], [address] [facility], [place], [facility]          Discharge Medication List as of 12/31/2019 11:08 AM      START taking these medications    Details   cephalexin (KEFLEX) 500 mg capsule Take 1 capsule (500 mg total) by mouth every 12 (twelve) hours for 7 days, Starting Tue 12/31/2019, Until Tue 1/7/2020, Print         CONTINUE these medications which have NOT CHANGED    Details   gentamicin (GENTAK) 0 3 % ophthalmic ointment Administer 0 5 inches into the left eye 3 (three) times a day for 5 days APPLY OINTMENT TO LEFT EYELID, Starting Fri 12/27/2019, Until Wed 1/1/2020, Normal           No discharge procedures on file      ED Provider  Electronically Signed by           Juliann Christianson PA-C  12/31/19 0730

## 2020-08-10 ENCOUNTER — NURSE TRIAGE (OUTPATIENT)
Dept: OTHER | Facility: OTHER | Age: 40
End: 2020-08-10

## 2020-08-10 DIAGNOSIS — Z20.828 SARS-ASSOCIATED CORONAVIRUS EXPOSURE: ICD-10-CM

## 2020-08-10 DIAGNOSIS — Z20.828 SARS-ASSOCIATED CORONAVIRUS EXPOSURE: Primary | ICD-10-CM

## 2020-08-10 PROCEDURE — U0003 INFECTIOUS AGENT DETECTION BY NUCLEIC ACID (DNA OR RNA); SEVERE ACUTE RESPIRATORY SYNDROME CORONAVIRUS 2 (SARS-COV-2) (CORONAVIRUS DISEASE [COVID-19]), AMPLIFIED PROBE TECHNIQUE, MAKING USE OF HIGH THROUGHPUT TECHNOLOGIES AS DESCRIBED BY CMS-2020-01-R: HCPCS | Performed by: FAMILY MEDICINE

## 2020-08-10 NOTE — TELEPHONE ENCOUNTER
Regarding: Covid concerns ([place])  ----- Message from Ginger Moore sent at 8/10/2020 12:25 PM EDT -----  " Patient has no symptoms but need to be tested for work, he was exposed to a coworker (no pcp)  "

## 2020-08-10 NOTE — TELEPHONE ENCOUNTER
Reason for Disposition   [1] COVID-19 EXPOSURE (Close Contact) AND [2] within last 14 days BUT [2] NO symptoms    Answer Assessment - Initial Assessment Questions  1  CLOSE CONTACT: "Who is the person with the confirmed or suspected COVID-19 infection that you were exposed to?"      Exposed to a coworker who tested positive for COVID-19  2  PLACE of CONTACT: "Where were you when you were exposed to COVID-19?" (e g , home, school, medical waiting room; which city?)      Works at [employer]  3  TYPE of CONTACT: "How much contact was there?" (e g , sitting next to, live in same house, work in same office, same building)      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  4  DURATION of CONTACT: "How long were you in contact with the COVID-19 patient?" (e g , a few seconds, passed by person, a few minutes, live with the patient)      Unknown  5  DATE of CONTACT: "When did you have contact with a COVID-19 patient?" (e g , how many days ago)       8/6/2020  6  TRAVEL: "Have you traveled out of the country recently?" If so, "When and where?"      * Also ask about out-of-state travel, since the CDC has identified some high-risk cities for community spread in the [address] [place] [address] [address]  * Note: Travel becomes less relevant if there is widespread community transmission where the patient lives  Denied  7  COMMUNITY SPREAD: "Are there lots of cases of COVID-19 (community spread) where you live?" (See public health department website, if unsure)        Lives in [place], [place]  8  SYMPTOMS: "Do you have any symptoms?" (e g , fever, cough, breathing difficulty)     Asymptomatic  Temperature has been around 97 3-97 5  10  HIGH RISK: "Do you have any heart or lung problems?  Do you have a weak immune system?" (e g , CHF, COPD, asthma, HIV positive, chemotherapy, renal failure, diabetes mellitus, sickle cell anemia)        Denied    Protocols used: CORONAVIRUS (COVID-19) EXPOSURE-ADULT-[place]

## 2020-08-11 LAB — SARS-COV-2 RNA SPEC QL NAA+PROBE: NOT DETECTED

## 2020-08-13 ENCOUNTER — TELEPHONE (OUTPATIENT)
Dept: OTHER | Facility: OTHER | Age: 40
End: 2020-08-13

## 2020-08-13 NOTE — TELEPHONE ENCOUNTER
Your test for COVID-19, also known as novel coronavirus, came back negative  You do not have COVID-19  If you have any additional questions, we can schedule a virtual visit for you with a provider or call the [facility] [phone] Option 7 for care advice  For additional information , please visit the Coronavirus FAQ on the [address] [address]  ([facility])  Advised that test results can be printed out from My Chart  Patient verbalized understanding and denied having any questions

## 2020-11-09 ENCOUNTER — HOSPITAL ENCOUNTER (EMERGENCY)
Facility: HOSPITAL | Age: 40
Discharge: HOME | End: 2020-11-09
Attending: EMERGENCY MEDICINE | Admitting: EMERGENCY MEDICINE
Payer: COMMERCIAL

## 2020-11-09 ENCOUNTER — APPOINTMENT (EMERGENCY)
Dept: RADIOLOGY | Facility: HOSPITAL | Age: 40
End: 2020-11-09
Payer: COMMERCIAL

## 2020-11-09 VITALS
TEMPERATURE: 97.5 F | SYSTOLIC BLOOD PRESSURE: 139 MMHG | RESPIRATION RATE: 16 BRPM | WEIGHT: 223 LBS | OXYGEN SATURATION: 96 % | DIASTOLIC BLOOD PRESSURE: 93 MMHG | BODY MASS INDEX: 33.91 KG/M2 | HEART RATE: 65 BPM

## 2020-11-09 DIAGNOSIS — R07.9 CHEST PAIN: Primary | ICD-10-CM

## 2020-11-09 LAB
ANION GAP SERPL CALCULATED.3IONS-SCNC: 4 MMOL/L (ref 4–13)
BASOPHILS # BLD AUTO: 0.03 THOUSANDS/ΜL (ref 0–0.1)
BASOPHILS NFR BLD AUTO: 1 % (ref 0–1)
BUN SERPL-MCNC: 18 MG/DL (ref 5–25)
CALCIUM SERPL-MCNC: 9.2 MG/DL (ref 8.3–10.1)
CHLORIDE SERPL-SCNC: 103 MMOL/L (ref 100–108)
CO2 SERPL-SCNC: 31 MMOL/L (ref 21–32)
CREAT SERPL-MCNC: 0.91 MG/DL (ref 0.6–1.3)
EOSINOPHIL # BLD AUTO: 0.08 THOUSAND/ΜL (ref 0–0.61)
EOSINOPHIL NFR BLD AUTO: 2 % (ref 0–6)
ERYTHROCYTE [DISTWIDTH] IN BLOOD BY AUTOMATED COUNT: 12.3 % (ref 11.6–15.1)
GFR SERPL CREATININE-BSD FRML MDRD: 106 ML/MIN/1.73SQ M
GLUCOSE SERPL-MCNC: 113 MG/DL (ref 65–140)
HCT VFR BLD AUTO: 52.1 % (ref 36.5–49.3)
HGB BLD-MCNC: 17.4 G/DL (ref 12–17)
IMM GRANULOCYTES # BLD AUTO: 0.01 THOUSAND/UL (ref 0–0.2)
IMM GRANULOCYTES NFR BLD AUTO: 0 % (ref 0–2)
LYMPHOCYTES # BLD AUTO: 1.68 THOUSANDS/ΜL (ref 0.6–4.47)
LYMPHOCYTES NFR BLD AUTO: 32 % (ref 14–44)
MCH RBC QN AUTO: 29 PG (ref 26.8–34.3)
MCHC RBC AUTO-ENTMCNC: 33.4 G/DL (ref 31.4–37.4)
MCV RBC AUTO: 87 FL (ref 82–98)
MONOCYTES # BLD AUTO: 0.33 THOUSAND/ΜL (ref 0.17–1.22)
MONOCYTES NFR BLD AUTO: 6 % (ref 4–12)
NEUTROPHILS # BLD AUTO: 3.12 THOUSANDS/ΜL (ref 1.85–7.62)
NEUTS SEG NFR BLD AUTO: 59 % (ref 43–75)
NRBC BLD AUTO-RTO: 0 /100 WBCS
PLATELET # BLD AUTO: 187 THOUSANDS/UL (ref 149–390)
PMV BLD AUTO: 10.8 FL (ref 8.9–12.7)
POTASSIUM SERPL-SCNC: 4.5 MMOL/L (ref 3.5–5.3)
RBC # BLD AUTO: 6 MILLION/UL (ref 3.88–5.62)
SODIUM SERPL-SCNC: 138 MMOL/L (ref 136–145)
TROPONIN I SERPL-MCNC: <0.02 NG/ML
WBC # BLD AUTO: 5.25 THOUSAND/UL (ref 4.31–10.16)

## 2020-11-09 PROCEDURE — 80048 BASIC METABOLIC PNL TOTAL CA: CPT | Performed by: PHYSICIAN ASSISTANT

## 2020-11-09 PROCEDURE — 84484 ASSAY OF TROPONIN QUANT: CPT | Performed by: PHYSICIAN ASSISTANT

## 2020-11-09 PROCEDURE — 71045 X-RAY EXAM CHEST 1 VIEW: CPT

## 2020-11-09 PROCEDURE — 85025 COMPLETE CBC W/AUTO DIFF WBC: CPT | Performed by: PHYSICIAN ASSISTANT

## 2020-11-09 PROCEDURE — 99285 EMERGENCY DEPT VISIT HI MDM: CPT

## 2020-11-09 PROCEDURE — 93005 ELECTROCARDIOGRAM TRACING: CPT

## 2020-11-09 PROCEDURE — 99285 EMERGENCY DEPT VISIT HI MDM: CPT | Performed by: PHYSICIAN ASSISTANT

## 2020-11-09 PROCEDURE — 36415 COLL VENOUS BLD VENIPUNCTURE: CPT | Performed by: PHYSICIAN ASSISTANT

## 2020-11-15 LAB
ATRIAL RATE: 60 BPM
P AXIS: 20 DEGREES
PR INTERVAL: 164 MS
QRS AXIS: -36 DEGREES
QRSD INTERVAL: 110 MS
QT INTERVAL: 396 MS
QTC INTERVAL: 396 MS
T WAVE AXIS: 13 DEGREES
VENTRICULAR RATE: 60 BPM

## 2020-11-15 PROCEDURE — 93010 ELECTROCARDIOGRAM REPORT: CPT | Performed by: INTERNAL MEDICINE

## 2021-12-09 ENCOUNTER — APPOINTMENT (EMERGENCY)
Dept: RADIOLOGY | Facility: HOSPITAL | Age: 41
End: 2021-12-09
Payer: COMMERCIAL

## 2021-12-09 ENCOUNTER — HOSPITAL ENCOUNTER (EMERGENCY)
Facility: HOSPITAL | Age: 41
Discharge: HOME | End: 2021-12-09
Attending: EMERGENCY MEDICINE | Admitting: EMERGENCY MEDICINE
Payer: COMMERCIAL

## 2021-12-09 VITALS
HEIGHT: 68 IN | SYSTOLIC BLOOD PRESSURE: 138 MMHG | OXYGEN SATURATION: 95 % | BODY MASS INDEX: 34.1 KG/M2 | DIASTOLIC BLOOD PRESSURE: 74 MMHG | RESPIRATION RATE: 18 BRPM | HEART RATE: 84 BPM | WEIGHT: 225 LBS | TEMPERATURE: 100.9 F

## 2021-12-09 DIAGNOSIS — R68.89 FLU-LIKE SYMPTOMS: Primary | ICD-10-CM

## 2021-12-09 DIAGNOSIS — R05.9 COUGH: ICD-10-CM

## 2021-12-09 PROCEDURE — 71045 X-RAY EXAM CHEST 1 VIEW: CPT

## 2021-12-09 PROCEDURE — U0003 INFECTIOUS AGENT DETECTION BY NUCLEIC ACID (DNA OR RNA); SEVERE ACUTE RESPIRATORY SYNDROME CORONAVIRUS 2 (SARS-COV-2) (CORONAVIRUS DISEASE [COVID-19]), AMPLIFIED PROBE TECHNIQUE, MAKING USE OF HIGH THROUGHPUT TECHNOLOGIES AS DESCRIBED BY CMS-2020-01-R: HCPCS | Performed by: EMERGENCY MEDICINE

## 2021-12-09 PROCEDURE — U0005 INFEC AGEN DETEC AMPLI PROBE: HCPCS | Performed by: EMERGENCY MEDICINE

## 2021-12-09 PROCEDURE — 99283 EMERGENCY DEPT VISIT LOW MDM: CPT | Performed by: EMERGENCY MEDICINE

## 2021-12-09 PROCEDURE — 99284 EMERGENCY DEPT VISIT MOD MDM: CPT

## 2021-12-09 RX ORDER — ACETAMINOPHEN 325 MG/1
650 TABLET ORAL ONCE
Status: COMPLETED | OUTPATIENT
Start: 2021-12-09 | End: 2021-12-09

## 2021-12-09 RX ADMIN — ACETAMINOPHEN 650 MG: 325 TABLET, FILM COATED ORAL at 01:25

## 2021-12-10 LAB — SARS-COV-2 RNA RESP QL NAA+PROBE: POSITIVE

## 2022-04-22 ENCOUNTER — APPOINTMENT (OUTPATIENT)
Dept: LAB | Facility: CLINIC | Age: 42
End: 2022-04-22
Payer: COMMERCIAL

## 2022-04-22 DIAGNOSIS — Z13.220 SCREENING FOR LIPOID DISORDERS: ICD-10-CM

## 2022-04-22 LAB
ALBUMIN SERPL BCP-MCNC: 4 G/DL (ref 3.5–5)
ALP SERPL-CCNC: 117 U/L (ref 46–116)
ALT SERPL W P-5'-P-CCNC: 41 U/L (ref 12–78)
ANION GAP SERPL CALCULATED.3IONS-SCNC: 9 MMOL/L (ref 4–13)
AST SERPL W P-5'-P-CCNC: 34 U/L (ref 5–45)
BASOPHILS # BLD AUTO: 0.03 THOUSANDS/ΜL (ref 0–0.1)
BASOPHILS NFR BLD AUTO: 1 % (ref 0–1)
BILIRUB SERPL-MCNC: 0.68 MG/DL (ref 0.2–1)
BUN SERPL-MCNC: 19 MG/DL (ref 5–25)
CALCIUM SERPL-MCNC: 8.9 MG/DL (ref 8.3–10.1)
CHLORIDE SERPL-SCNC: 108 MMOL/L (ref 100–108)
CHOLEST SERPL-MCNC: 187 MG/DL
CO2 SERPL-SCNC: 27 MMOL/L (ref 21–32)
CREAT SERPL-MCNC: 0.86 MG/DL (ref 0.6–1.3)
EOSINOPHIL # BLD AUTO: 0.2 THOUSAND/ΜL (ref 0–0.61)
EOSINOPHIL NFR BLD AUTO: 3 % (ref 0–6)
ERYTHROCYTE [DISTWIDTH] IN BLOOD BY AUTOMATED COUNT: 13.5 % (ref 11.6–15.1)
EST. AVERAGE GLUCOSE BLD GHB EST-MCNC: 117 MG/DL
GFR SERPL CREATININE-BSD FRML MDRD: 107 ML/MIN/1.73SQ M
GLUCOSE P FAST SERPL-MCNC: 103 MG/DL (ref 65–99)
HBA1C MFR BLD: 5.7 %
HCT VFR BLD AUTO: 48.1 % (ref 36.5–49.3)
HDLC SERPL-MCNC: 37 MG/DL
HGB BLD-MCNC: 16 G/DL (ref 12–17)
IMM GRANULOCYTES # BLD AUTO: 0.02 THOUSAND/UL (ref 0–0.2)
IMM GRANULOCYTES NFR BLD AUTO: 0 % (ref 0–2)
LDLC SERPL CALC-MCNC: 112 MG/DL (ref 0–100)
LYMPHOCYTES # BLD AUTO: 2.48 THOUSANDS/ΜL (ref 0.6–4.47)
LYMPHOCYTES NFR BLD AUTO: 41 % (ref 14–44)
MCH RBC QN AUTO: 28.6 PG (ref 26.8–34.3)
MCHC RBC AUTO-ENTMCNC: 33.3 G/DL (ref 31.4–37.4)
MCV RBC AUTO: 86 FL (ref 82–98)
MONOCYTES # BLD AUTO: 0.37 THOUSAND/ΜL (ref 0.17–1.22)
MONOCYTES NFR BLD AUTO: 6 % (ref 4–12)
NEUTROPHILS # BLD AUTO: 2.96 THOUSANDS/ΜL (ref 1.85–7.62)
NEUTS SEG NFR BLD AUTO: 49 % (ref 43–75)
NONHDLC SERPL-MCNC: 150 MG/DL
NRBC BLD AUTO-RTO: 0 /100 WBCS
PLATELET # BLD AUTO: 179 THOUSANDS/UL (ref 149–390)
PMV BLD AUTO: 11.7 FL (ref 8.9–12.7)
POTASSIUM SERPL-SCNC: 4 MMOL/L (ref 3.5–5.3)
PROT SERPL-MCNC: 7.3 G/DL (ref 6.4–8.2)
RBC # BLD AUTO: 5.59 MILLION/UL (ref 3.88–5.62)
SODIUM SERPL-SCNC: 144 MMOL/L (ref 136–145)
TRIGL SERPL-MCNC: 188 MG/DL
TSH SERPL DL<=0.05 MIU/L-ACNC: 1.88 UIU/ML (ref 0.45–4.5)
WBC # BLD AUTO: 6.06 THOUSAND/UL (ref 4.31–10.16)

## 2022-04-22 PROCEDURE — 83036 HEMOGLOBIN GLYCOSYLATED A1C: CPT

## 2022-04-22 PROCEDURE — 80061 LIPID PANEL: CPT

## 2022-04-22 PROCEDURE — 85025 COMPLETE CBC W/AUTO DIFF WBC: CPT

## 2022-04-22 PROCEDURE — 84443 ASSAY THYROID STIM HORMONE: CPT

## 2022-04-22 PROCEDURE — 36415 COLL VENOUS BLD VENIPUNCTURE: CPT

## 2022-04-22 PROCEDURE — 80053 COMPREHEN METABOLIC PANEL: CPT

## 2022-06-22 ENCOUNTER — APPOINTMENT (EMERGENCY)
Dept: RADIOLOGY | Facility: HOSPITAL | Age: 42
End: 2022-06-22
Payer: OTHER MISCELLANEOUS

## 2022-06-22 ENCOUNTER — HOSPITAL ENCOUNTER (EMERGENCY)
Facility: HOSPITAL | Age: 42
Discharge: HOME | End: 2022-06-22
Attending: EMERGENCY MEDICINE
Payer: OTHER MISCELLANEOUS

## 2022-06-22 VITALS
TEMPERATURE: 97.7 F | RESPIRATION RATE: 24 BRPM | WEIGHT: 233.03 LBS | DIASTOLIC BLOOD PRESSURE: 96 MMHG | HEART RATE: 69 BPM | SYSTOLIC BLOOD PRESSURE: 134 MMHG | BODY MASS INDEX: 35.43 KG/M2 | OXYGEN SATURATION: 100 %

## 2022-06-22 DIAGNOSIS — S01.81XA FACIAL LACERATION, INITIAL ENCOUNTER: ICD-10-CM

## 2022-06-22 DIAGNOSIS — J33.8 MAXILLARY SINUS POLYP: ICD-10-CM

## 2022-06-22 DIAGNOSIS — S09.90XA ACUTE HEAD INJURY WITHOUT LOSS OF CONSCIOUSNESS, INITIAL ENCOUNTER: Primary | ICD-10-CM

## 2022-06-22 PROCEDURE — 99284 EMERGENCY DEPT VISIT MOD MDM: CPT

## 2022-06-22 PROCEDURE — 99284 EMERGENCY DEPT VISIT MOD MDM: CPT | Performed by: EMERGENCY MEDICINE

## 2022-06-22 PROCEDURE — 70450 CT HEAD/BRAIN W/O DYE: CPT

## 2022-06-22 PROCEDURE — 96372 THER/PROPH/DIAG INJ SC/IM: CPT

## 2022-06-22 PROCEDURE — 12011 RPR F/E/E/N/L/M 2.5 CM/<: CPT | Performed by: EMERGENCY MEDICINE

## 2022-06-22 RX ORDER — KETOROLAC TROMETHAMINE 30 MG/ML
30 INJECTION, SOLUTION INTRAMUSCULAR; INTRAVENOUS ONCE
Status: COMPLETED | OUTPATIENT
Start: 2022-06-22 | End: 2022-06-22

## 2022-06-22 RX ADMIN — KETOROLAC TROMETHAMINE 30 MG: 30 INJECTION, SOLUTION INTRAMUSCULAR at 22:17

## 2022-06-22 NOTE — Clinical Note
George Bahena was seen and treated in our emergency department on 6/22/2022  Diagnosis:     Guicho  may return to work on return date  He may return on this date: 06/24/2022         If you have any questions or concerns, please don't hesitate to call        Chelsea Patterson DO    ______________________________           _______________          _______________  Hospital Representative                              Date                                Time

## 2022-06-23 NOTE — ED PROVIDER NOTES
History  Chief Complaint   Patient presents with    Head Injury     States about 2;30 fell on piece of slippery steel and hit head  No loc no thinners  Lac to forehead     Patient in the ER after a mechanical fall  He states that while at work, at approximately 2:30 p m , he slipped and fell onto his face  He denies loss of consciousness  Patient applied a Band-Aid to the affected area, and resumed work  Patient states that on his way home, he developed a headache, which is unrelieved with Tylenol which she took at 4:30 a m  He denies nausea, vomiting  History provided by:  Patient   used: No    Head Injury w/unknown LOC  Location:  Frontal  Mechanism of injury: fall    Fall:     Fall occurred:  Walking    Point of impact:  Head    Entrapped after fall: no    Pain details:     Quality:  Aching    Radiates to: Face    Severity:  Moderate    Timing:  Constant    Progression:  Worsening  Chronicity:  New  Relieved by:  Nothing  Worsened by:  Nothing  Ineffective treatments:  None tried  Associated symptoms: no nausea and no vomiting        None       History reviewed  No pertinent past medical history  Past Surgical History:   Procedure Laterality Date    NO PAST SURGERIES      MS LAP,APPENDECTOMY N/A 4/17/2017    Procedure: APPENDECTOMY LAPAROSCOPIC;  Surgeon: Calista Brower DO;  [facility];  Service: General       History reviewed  No pertinent family history  I have reviewed and agree with the history as documented  E-Cigarette/Vaping    E-Cigarette Use Never User      E-Cigarette/Vaping Substances     Social History     Tobacco Use    Smoking status: Never Smoker    Smokeless tobacco: Never Used   Vaping Use    Vaping Use: Never used   Substance Use Topics    Alcohol use: Yes     Comment: socially    Drug use: No       Review of Systems   Constitutional: Negative for chills and fever  Respiratory: Negative for cough, shortness of breath and wheezing  Cardiovascular: Negative for chest pain and palpitations  Gastrointestinal: Negative for abdominal pain, constipation, diarrhea, nausea and vomiting  Genitourinary: Negative for dysuria, flank pain, hematuria and urgency  Musculoskeletal: Negative for back pain  Skin: Negative for color change and rash  All other systems reviewed and are negative  Physical Exam  Physical Exam  Vitals and nursing note reviewed  Constitutional:       Appearance: He is well-developed  HENT:      Head: Normocephalic  Eyes:      Extraocular Movements: Extraocular movements intact  Pupils: Pupils are equal, round, and reactive to light  Cardiovascular:      Rate and Rhythm: Normal rate and regular rhythm  Heart sounds: Normal heart sounds  Pulmonary:      Effort: Pulmonary effort is normal       Breath sounds: Normal breath sounds  Abdominal:      General: Bowel sounds are normal  There is no distension  Palpations: Abdomen is soft  There is no mass  Tenderness: There is no abdominal tenderness  There is no guarding or rebound  Musculoskeletal:      Cervical back: Normal range of motion and neck supple  Skin:     General: Skin is warm and dry  Capillary Refill: Capillary refill takes less than 2 seconds  Neurological:      Mental Status: He is alert and oriented to person, place, and time  Psychiatric:         Behavior: Behavior normal          Thought Content:  Thought content normal          Judgment: Judgment normal          Vital Signs  ED Triage Vitals [06/22/22 1951]   Temperature Pulse Respirations Blood Pressure SpO2   97 7 °F (36 5 °C) 69 (!) 24 134/96 100 %      Temp Source Heart Rate Source Patient Position - Orthostatic VS BP Location FiO2 (%)   Tympanic Monitor Sitting Right arm --      Pain Score       6           Vitals:    06/22/22 1951   BP: 134/96   Pulse: 69   Patient Position - Orthostatic VS: Sitting         Visual Acuity      ED Medications  Medications ketorolac (TORADOL) injection 30 mg (30 mg Intramuscular Given 6/22/22 2217)       Diagnostic Studies  Results Reviewed     None                 CT head without contrast   Final Result by Selena Maria DO (06/22 2116)      No acute intracranial abnormality  Workstation performed: [id number]                    Procedures  Laceration repair    Date/Time: 6/22/2022 8:20 PM  Performed by: Cordelia Castillo DO  Authorized by: Cordelia Castillo DO   Consent: Verbal consent obtained  Risks and benefits: risks, benefits and alternatives were discussed  Consent given by: patient  Patient understanding: patient states understanding of the procedure being performed  Radiology Images displayed and confirmed  If images not available, report reviewed: imaging studies available  Required items: required blood products, implants, devices, and special equipment available  Patient identity confirmed: verbally with patient  Time out: Immediately prior to procedure a "time out" was called to verify the correct patient, procedure, equipment, support staff and site/side marked as required  Body area: head/neck  Location details: forehead  Laceration length: 1 cm  Foreign bodies: no foreign bodies  Tendon involvement: none  Nerve involvement: none  Vascular damage: no    Sedation:  Patient sedated: no      Wound Dehiscence:  Superficial Wound Dehiscence: simple closure      Procedure Details:  Preparation: Patient was prepped and draped in the usual sterile fashion    Irrigation solution: tap water  Irrigation method: tap  Amount of cleaning: standard  Debridement: none  Degree of undermining: none  Skin closure: glue  Approximation: close  Approximation difficulty: simple  Patient tolerance: patient tolerated the procedure well with no immediate complications               ED Course                               SBIRT 20yo+    Flowsheet Row Most Recent Value   SBIRT (25 yo +)    In order to provide better care to our patients, we are screening all of our patients for alcohol and drug use  Would it be okay to ask you these screening questions? No Filed at: 06/22/2022 [facility]                    [facility]  Number of Diagnoses or Management Options  Acute head injury without loss of consciousness, initial encounter: new and requires workup  Facial laceration, initial encounter: new and requires workup  Maxillary sinus polyp or cyst: new and requires workup     Amount and/or Complexity of Data Reviewed  Clinical lab tests: reviewed  Tests in the radiology section of CPT®: ordered and reviewed    Risk of Complications, Morbidity, and/or Mortality  Presenting problems: high  Diagnostic procedures: high  Management options: high    Patient Progress  Patient progress: improved      Disposition  Final diagnoses:   Acute head injury without loss of consciousness, initial encounter   Facial laceration, initial encounter   Maxillary sinus polyp or cyst     Time reflects when diagnosis was documented in both MDM as applicable and the Disposition within this note     Time User Action Codes Description Comment    6/22/2022  9:50 PM Merryl Vladimir O Add [S09 90XA] Acute head injury without loss of consciousness, initial encounter     6/22/2022  9:50 PM Merryl Vladimir O Add [S01 81XA] Facial laceration, initial encounter     6/22/2022 10:07 PM Merryl Vladimir Add [J33 8] Maxillary sinus polyp     6/22/2022 10:07 PM Merryl Vladimir Modify [J33 8] Maxillary sinus polyp or cyst       ED Disposition     ED Disposition   Discharge    Condition   Stable    Date/Time   Wed Jun 22, 2022  9:49 PM    Comment   Tacho Sweeney discharge to home/self care                 Follow-up Information     Follow up With Specialties Details Why Contact Info Additional Information    Latasha Zapien MD Family Medicine Schedule an appointment as soon as possible for a visit in 2 days for follow up [facility] [address] [facility] Schedule an appointment as soon as possible for a visit in 2 days for follow up [address]       [address] [facility] [place] [facility] Schedule an appointment as soon as possible for a visit in 2 days for follow up for maxillary sinus polyp/cyst [facility]  [place] [address] [address]  [address] [facility] Tiki Christine [facility], [address] [facility], [address], [phone]          There are no discharge medications for this patient  No discharge procedures on file      PDMP Review     None          ED Provider  Electronically Signed by           Cordelia Castillo DO  06/23/22 6814

## 2022-06-23 NOTE — DISCHARGE INSTRUCTIONS
Return to the ER for further concerns or worsening symptoms  Follow up with your primary care physician and concussion center in 1-2 days  Continue tylenol or ibuprofen for pain

## 2022-07-28 ENCOUNTER — OFFICE VISIT (OUTPATIENT)
Dept: OTOLARYNGOLOGY | Facility: CLINIC | Age: 42
End: 2022-07-28
Payer: COMMERCIAL

## 2022-07-28 VITALS
DIASTOLIC BLOOD PRESSURE: 74 MMHG | SYSTOLIC BLOOD PRESSURE: 116 MMHG | WEIGHT: 230 LBS | HEART RATE: 80 BPM | TEMPERATURE: 97.3 F | BODY MASS INDEX: 34.86 KG/M2 | HEIGHT: 68 IN | OXYGEN SATURATION: 95 %

## 2022-07-28 DIAGNOSIS — J34.1 MUCOUS RETENTION CYST OF MAXILLARY SINUS: Primary | ICD-10-CM

## 2022-07-28 PROCEDURE — 99204 OFFICE O/P NEW MOD 45 MIN: CPT | Performed by: OTOLARYNGOLOGY

## 2022-07-28 RX ORDER — CLOBETASOL PROPIONATE 0.5 MG/G
CREAM TOPICAL
COMMUNITY
Start: 2022-04-20

## 2022-07-28 RX ORDER — CLONAZEPAM 0.25 MG/1
TABLET, ORALLY DISINTEGRATING ORAL
COMMUNITY
Start: 2022-04-20

## 2022-07-28 NOTE — PROGRESS NOTES
Assessment/Plan:  The patient's right maxillary sinus cyst is insignificant, and completely asymptomatic  F/u PRN  No problem-specific Assessment & Plan notes found for this encounter  Diagnoses and all orders for this visit:    Mucous retention cyst of maxillary sinus    Other orders  -     clonazePAM (KlonoPIN) 0 25 MG disintegrating tablet; PLACE 1 TABLET (0 25 MG) ON TOP OF THE TONGUE WHERE IT WILL DISSOLVE,THEN SWALLOW 2 TIMES PER DAY          Subjective:      Patient ID: Jeovany mOalley is a 40 y o  male  Pt referred for "sinus polyps"; he had a head CT following a fall, and was told that he needed to see an ENT  His CT only revealed a very small polyp vs cyst on the floor of the right maxillary sinus  The following portions of the patient's history were reviewed and updated as appropriate: allergies, current medications, past family history, past medical history, past social history, past surgical history and problem list     Review of Systems      Objective:      /74 (BP Location: Left arm, Patient Position: Sitting, Cuff Size: Adult)   Pulse 80   Temp (!) 97 3 °F (36 3 °C) (Temporal)   Ht 5' 8" (1 727 m)   Wt 104 kg (230 lb)   SpO2 95%   BMI 34 97 kg/m²          Physical Exam  Constitutional:       Appearance: He is well-developed  HENT:      Head: Normocephalic and atraumatic  Right Ear: Tympanic membrane, ear canal and external ear normal  No drainage  No middle ear effusion  Left Ear: Tympanic membrane, ear canal and external ear normal  No drainage  No middle ear effusion  Nose: Nose normal       Mouth/Throat:      Pharynx: Uvula midline  No oropharyngeal exudate  Tonsils: 2+ on the right  2+ on the left  Neck:      Thyroid: No thyroid mass or thyromegaly  Trachea: Trachea normal  No tracheal deviation  Lymphadenopathy:      Cervical: No cervical adenopathy  Neurological:      Mental Status: He is alert

## 2023-07-02 ENCOUNTER — HOSPITAL ENCOUNTER (EMERGENCY)
Facility: HOSPITAL | Age: 43
Discharge: HOME | End: 2023-07-02
Attending: EMERGENCY MEDICINE

## 2023-07-02 VITALS
HEIGHT: 68 IN | HEART RATE: 78 BPM | BODY MASS INDEX: 34.97 KG/M2 | DIASTOLIC BLOOD PRESSURE: 82 MMHG | OXYGEN SATURATION: 97 % | TEMPERATURE: 97.8 F | SYSTOLIC BLOOD PRESSURE: 137 MMHG | RESPIRATION RATE: 18 BRPM

## 2023-07-02 DIAGNOSIS — H81.02 MENIERE'S DISEASE OF LEFT EAR: Primary | ICD-10-CM

## 2023-07-02 PROCEDURE — 96374 THER/PROPH/DIAG INJ IV PUSH: CPT

## 2023-07-02 PROCEDURE — 93005 ELECTROCARDIOGRAM TRACING: CPT

## 2023-07-02 PROCEDURE — 99283 EMERGENCY DEPT VISIT LOW MDM: CPT

## 2023-07-02 RX ORDER — MECLIZINE HYDROCHLORIDE 25 MG/1
25 TABLET ORAL ONCE
Status: COMPLETED | OUTPATIENT
Start: 2023-07-02 | End: 2023-07-02

## 2023-07-02 RX ORDER — MECLIZINE HYDROCHLORIDE 25 MG/1
25 TABLET ORAL 3 TIMES DAILY PRN
Qty: 30 TABLET | Refills: 0 | Status: SHIPPED | OUTPATIENT
Start: 2023-07-02

## 2023-07-02 RX ORDER — METHYLPREDNISOLONE SODIUM SUCCINATE 125 MG/2ML
125 INJECTION, POWDER, LYOPHILIZED, FOR SOLUTION INTRAMUSCULAR; INTRAVENOUS ONCE
Status: COMPLETED | OUTPATIENT
Start: 2023-07-02 | End: 2023-07-02

## 2023-07-02 RX ORDER — PREDNISONE 20 MG/1
TABLET ORAL
Qty: 32 TABLET | Refills: 0 | Status: SHIPPED | OUTPATIENT
Start: 2023-07-02 | End: 2023-07-20

## 2023-07-02 RX ADMIN — METHYLPREDNISOLONE SODIUM SUCCINATE 125 MG: 125 INJECTION, POWDER, FOR SOLUTION INTRAMUSCULAR; INTRAVENOUS at 21:18

## 2023-07-02 RX ADMIN — MECLIZINE HYDROCHLORIDE 25 MG: 25 TABLET ORAL at 21:19

## 2023-07-03 LAB
ATRIAL RATE: 75 BPM
P AXIS: 31 DEGREES
PR INTERVAL: 156 MS
QRS AXIS: -21 DEGREES
QRSD INTERVAL: 108 MS
QT INTERVAL: 388 MS
QTC INTERVAL: 433 MS
T WAVE AXIS: 40 DEGREES
VENTRICULAR RATE: 75 BPM

## 2023-07-03 PROCEDURE — 93010 ELECTROCARDIOGRAM REPORT: CPT | Performed by: INTERNAL MEDICINE

## 2023-07-03 NOTE — ED PROVIDER NOTES
History  Chief Complaint   Patient presents with   • Dizziness     Pt states an hour ago he was sitting on his couch and became dizzy. Pt states nausea and vomiting. Pt denies chest pain, blurred vision, or one sided weakness. Pt c/o ringing in left ear. Pt denies being sick recently. Pt states half a beer today. 56-year-old male no significant past medical history presenting to ED today with about 1 hour of vertigo-like sensation. Patient states that last night he started with ringing in the left ear. He was not having any dizziness at that time. He states that today he was sitting on the couch when he started the dizziness. He says that the ringing in his ear also came back today as well. He is also having some left-sided fullness in the ear. Denies any blurry vision. No slurred speech. No weakness. No chest pain or shortness of breath. No recent travel          Prior to Admission Medications   Prescriptions Last Dose Informant Patient Reported? Taking? clobetasol (TEMOVATE) 0.05 % cream   Yes No   clonazePAM (KlonoPIN) 0.25 MG disintegrating tablet   Yes No   Sig: PLACE 1 TABLET (0.25 MG) ON TOP OF THE TONGUE WHERE IT WILL DISSOLVE,THEN SWALLOW 2 TIMES PER DAY      Facility-Administered Medications: None       History reviewed. No pertinent past medical history. Past Surgical History:   Procedure Laterality Date   • NO PAST SURGERIES     • [place] LAPAROSCOPIC APPENDECTOMY N/A 4/17/2017    Procedure: APPENDECTOMY LAPAROSCOPIC;  Surgeon: Yazmin Rothman DO;  [facility];  Service: General       History reviewed. No pertinent family history. I have reviewed and agree with the history as documented.     E-Cigarette/Vaping   • E-Cigarette Use Never User      E-Cigarette/Vaping Substances   • Nicotine No    • THC No    • CBD No    • Flavoring No    • Other No    • Unknown No      Social History     Tobacco Use   • Smoking status: Never   • Smokeless tobacco: Never   Vaping Use   • Vaping Use: Never used   Substance Use Topics   • Alcohol use: Yes     Comment: socially   • Drug use: No       Review of Systems   Constitutional: Negative for chills and fever. HENT: Negative for hearing loss. Eyes: Negative for visual disturbance. Respiratory: Negative for shortness of breath. Cardiovascular: Negative for chest pain. Gastrointestinal: Negative for abdominal pain, constipation, diarrhea, nausea and vomiting. Genitourinary: Negative for difficulty urinating. Musculoskeletal: Negative for myalgias. Skin: Negative for rash. Neurological: Positive for dizziness. Psychiatric/Behavioral: Negative for agitation. All other systems reviewed and are negative. Physical Exam  Physical Exam  Vitals and nursing note reviewed. Constitutional:       General: He is not in acute distress. Appearance: Normal appearance. He is not ill-appearing. HENT:      Head: Normocephalic and atraumatic. Right Ear: External ear normal.      Left Ear: External ear normal.      Nose: Nose normal. No congestion. Mouth/Throat:      Mouth: Mucous membranes are moist.      Pharynx: Oropharynx is clear. No oropharyngeal exudate. Eyes:      Extraocular Movements: Extraocular movements intact. Conjunctiva/sclera: Conjunctivae normal.      Pupils: Pupils are equal, round, and reactive to light. Cardiovascular:      Rate and Rhythm: Normal rate and regular rhythm. Pulses: Normal pulses. Heart sounds: Normal heart sounds. No murmur heard. Pulmonary:      Effort: Pulmonary effort is normal. No respiratory distress. Breath sounds: Normal breath sounds. No wheezing. Abdominal:      General: Abdomen is flat. Bowel sounds are normal. There is no distension. Palpations: Abdomen is soft. Tenderness: There is no abdominal tenderness. Musculoskeletal:         General: No swelling. Normal range of motion. Cervical back: Normal range of motion. No rigidity.    Skin: General: Skin is warm and dry. Capillary Refill: Capillary refill takes less than 2 seconds. Neurological:      General: No focal deficit present. Mental Status: He is alert and oriented to person, place, and time. Mental status is at baseline. Cranial Nerves: No cranial nerve deficit. Sensory: No sensory deficit. Motor: No weakness. Coordination: Coordination normal.      Gait: Gait normal.   Psychiatric:         Mood and Affect: Mood normal.         Behavior: Behavior normal.         Vital Signs  ED Triage Vitals [07/02/23 2044]   Temperature Pulse Respirations Blood Pressure SpO2   97.8 °F (36.6 °C) 73 18 138/92 97 %      Temp Source Heart Rate Source Patient Position - Orthostatic VS BP Location FiO2 (%)   Oral Monitor Lying Left arm --      Pain Score       No Pain           Vitals:    07/02/23 2044 07/02/23 2157   BP: 138/92 137/82   Pulse: 73 78   Patient Position - Orthostatic VS: Lying Lying         Visual Acuity  Visual Acuity    Flowsheet Row Most Recent Value   L Pupil Size (mm) 3   R Pupil Size (mm) 3          ED Medications  Medications   meclizine (ANTIVERT) tablet 25 mg (25 mg Oral Given 7/2/23 2119)   methylPREDNISolone sodium succinate (Solu-MEDROL) injection 125 mg (125 mg Intravenous Given 7/2/23 2118)       Diagnostic Studies  Results Reviewed     None                 No orders to display              Procedures  Procedures         ED Course  ED Course as of 07/02/23 2247   Jake Pace Jul 02, 2023 2048 Procedure Note: EKG  Date/Time: 07/02/23 8:48 PM   Interpreted by: Woodrow Saldaña   Indications / Diagnosis: Dizziness  ECG reviewed by me, the ED Provider: yes   The EKG demonstrates:  Rhythm: normal sinus  Intervals: normal intervals  Axis: normal axis  QRS/Blocks: normal QRS  ST Changes: No acute ST Changes, no STD/ELMER. SBIRT 20yo+    Flowsheet Row Most Recent Value   Initial Alcohol Screen: US AUDIT-C     1.  How often do you have a drink containing alcohol? 2 Filed at: 07/02/2023 2048   2. How many drinks containing alcohol do you have on a typical day you are drinking? 0 Filed at: 07/02/2023 2048   3a. Male UNDER 65: How often do you have five or more drinks on one occasion? 0 Filed at: 07/02/2023 2048   3b. FEMALE Any Age, or MALE 65+: How often do you have 4 or more drinks on one occassion? 0 Filed at: 07/02/2023 2048   Audit-C Score 2 Filed at: 07/02/2023 2048   DANIELLA: How many times in the past year have you. .. Used an illegal drug or used a prescription medication for non-medical reasons? Never Filed at: 07/02/2023 2048                    Medical Decision Making  56-year-old male presenting to ED today with dizziness and tinnitus. At this time his exam is reassuring and no signs of central causes of vertigo. Likely Ménière's disease given the tinnitus and the vertigo. We will treat here with IV Solu-Medrol and give a dose of Antivert. Patient was reassessed and patient had improvement in his symptoms. We will place him on a prednisone taper. Prescription sent to his pharmacy. Also prescribe him Antivert for symptomatic relief of his vertigo. I discussed with patient that he should follow-up with ENT and information was given also encouraged him to follow-up with primary care provider. Strict return to ER precautions discussed and patient was discharged home. Meniere's disease of left ear: acute illness or injury  Risk  Prescription drug management.           Disposition  Final diagnoses:   Meniere's disease of left ear     Time reflects when diagnosis was documented in both MDM as applicable and the Disposition within this note     Time User Action Codes Description Comment    7/2/2023  9:43 PM Royce Harris Add [H81.02] Meniere's disease of left ear       ED Disposition     ED Disposition   Discharge    Condition   Stable    Date/Time   Sun Jul 2, 2023  9:43 PM    Comment   Norbert Lock discharge to home/self care.               Follow-up Information     Follow up With Specialties Details Why Contact Info Additional Information    [facility] Royanne Hausen Allergy Schedule an appointment as soon as possible for a visit  for follow up [address] [facility]  [place] [address] [address]  [address] [facility]NT [address] [facility], [address], [place] [address], [place], [place], [address], [address] [facility] Emergency Department Emergency Medicine Go to  If symptoms worsen, As needed [address] [address]  [address] [facility] Emergency Department, [address] [facility] [address], [place], [address]    Brendan Callaway MD Family Medicine Schedule an appointment as soon as possible for a visit in 2 days for follow up [address] [facility] [address] [facility]  [phone]             Discharge Medication List as of 7/2/2023  9:48 PM      START taking these medications    Details   meclizine (ANTIVERT) 25 mg tablet Take 1 tablet (25 mg total) by mouth 3 (three) times a day as needed for dizziness, Starting Sun 7/2/2023, Normal      predniSONE 20 mg tablet Multiple Dosages:Starting Sun 7/2/2023, Until Tue 7/4/2023 at 2359, THEN Starting Wed 7/5/2023, Until Fri 7/7/2023 at 2359, THEN Starting Sat 7/8/2023, Until Mon 7/10/2023 at 2359, THEN Starting Tue 7/11/2023, Until Thu 7/13/2023 at 2359, THEN Start ing Fri 7/14/2023, Until Sun 7/16/2023 at 2359, THEN Starting Mon 7/17/2023, Until Wed 7/19/2023 at 2359Take 3 tablets (60 mg total) by mouth daily for 3 days, THEN 2.5 tablets (50 mg total) daily for 3 days, THEN 2 tablets (40 mg total) daily for 3 da ys, THEN 1.5 tablets (30 mg total) daily for 3 days, THEN 1 tablet (20 mg total) daily for 3 days, THEN 0.5 tablets (10 mg total) daily for 3 days. , Normal         CONTINUE these medications which have NOT CHANGED    Details   clobetasol (TEMOVATE) 0.05 % cream Starting Wed 4/20/2022, Historical Med clonazePAM (KlonoPIN) 0.25 MG disintegrating tablet PLACE 1 TABLET (0.25 MG) ON TOP OF THE TONGUE WHERE IT WILL DISSOLVE,THEN SWALLOW 2 TIMES PER DAY, Historical Med             No discharge procedures on file.     PDMP Review     None          ED Provider  Electronically Signed by           Tucker Elam MD  07/02/23 0066

## 2023-07-03 NOTE — DISCHARGE INSTRUCTIONS
You will start on Prednisone as follows: Take 3 tablets (60 mg total) by mouth daily for 3 days, THEN 2.5 tablets (50 mg total) daily for 3 days, THEN 2 tablets (40 mg total) daily for 3 days, THEN 1.5 tablets (30 mg total) daily for 3 days, THEN 1 tablet (20 mg total) daily for 3 days, THEN 0.5 tablets (10 mg total) daily for 3 days    You will also take Meclizine up to three times a day as needed for dizziness. Please follow up with ENT below. Call their office to schedule an appointment for follow up. Return to the ER for any worsening symptoms. Comenzará con prednisona de la siguiente manera: [place] 3 tabletas (60 mg en total) por vía oral al día sarai 3 días, [place] 2,5 tabletas (50 mg en total) al día sarai 3 días, [place] 2 tabletas (40 mg en total) al día sarai 3 días, [place] 1,5 comprimidos (30 mg en total) al día sarai 3 días, [place] 1 comprimido (20 mg en total) al día sarai 3 días, [place] 0,5 comprimidos (10 mg en total) al día sarai 3 días    También tomará Meclizine hasta sam veces al día según sea necesario para los mareos. Aleene Crape un seguimiento con ENT a continuación. Llame a hill oficina para programar honorio ayush de seguimiento. Regrese a la jonathon de emergencias si los síntomas empeoran.

## 2024-01-25 ENCOUNTER — OFFICE VISIT (OUTPATIENT)
Dept: FAMILY MEDICINE CLINIC | Facility: CLINIC | Age: 44
End: 2024-01-25
Payer: COMMERCIAL

## 2024-01-25 VITALS
WEIGHT: 256 LBS | OXYGEN SATURATION: 97 % | HEIGHT: 68 IN | RESPIRATION RATE: 16 BRPM | DIASTOLIC BLOOD PRESSURE: 84 MMHG | TEMPERATURE: 98.4 F | HEART RATE: 96 BPM | BODY MASS INDEX: 38.8 KG/M2 | SYSTOLIC BLOOD PRESSURE: 122 MMHG

## 2024-01-25 DIAGNOSIS — G89.29 CHRONIC PAIN OF LEFT KNEE: ICD-10-CM

## 2024-01-25 DIAGNOSIS — F41.1 GENERALIZED ANXIETY DISORDER: ICD-10-CM

## 2024-01-25 DIAGNOSIS — Z00.00 ANNUAL PHYSICAL EXAM: Primary | ICD-10-CM

## 2024-01-25 DIAGNOSIS — R21 RASH: ICD-10-CM

## 2024-01-25 DIAGNOSIS — Z11.59 NEED FOR HEPATITIS C SCREENING TEST: ICD-10-CM

## 2024-01-25 DIAGNOSIS — M25.562 CHRONIC PAIN OF LEFT KNEE: ICD-10-CM

## 2024-01-25 DIAGNOSIS — E66.09 CLASS 2 OBESITY DUE TO EXCESS CALORIES WITHOUT SERIOUS COMORBIDITY WITH BODY MASS INDEX (BMI) OF 38.0 TO 38.9 IN ADULT: ICD-10-CM

## 2024-01-25 PROBLEM — K35.80 APPENDICITIS, ACUTE: Status: RESOLVED | Noted: 2017-04-17 | Resolved: 2024-01-25

## 2024-01-25 PROBLEM — E66.812 CLASS 2 OBESITY DUE TO EXCESS CALORIES WITHOUT SERIOUS COMORBIDITY WITH BODY MASS INDEX (BMI) OF 38.0 TO 38.9 IN ADULT: Status: ACTIVE | Noted: 2024-01-25

## 2024-01-25 PROCEDURE — 99204 OFFICE O/P NEW MOD 45 MIN: CPT | Performed by: FAMILY MEDICINE

## 2024-01-25 PROCEDURE — 99396 PREV VISIT EST AGE 40-64: CPT | Performed by: FAMILY MEDICINE

## 2024-01-25 RX ORDER — CLONAZEPAM 0.25 MG/1
0.25 TABLET, ORALLY DISINTEGRATING ORAL DAILY PRN
Qty: 20 TABLET | Refills: 0 | Status: SHIPPED | OUTPATIENT
Start: 2024-01-25

## 2024-01-25 NOTE — PROGRESS NOTES
Name: Guicho Sanabria      : 1980      MRN: [id number]  Encounter Provider: Patricia Hunter MD  Encounter Date: 2024   Encounter department: [facility] MEDICINE    Assessment & Plan     1. Annual physical exam  Assessment & Plan:  Check routine labs      Orders:  -     CBC and differential; Future  -     Comprehensive metabolic panel; Future; Expected date: 2024  -     Lipid panel; Future; Expected date: 2024    2. Need for hepatitis C screening test  -     Hepatitis C Antibody; Future    3. Rash  Assessment & Plan:  On knuckles and palms not helped with clobetasol will send to derm    Orders:  -     Ambulatory Referral to Dermatology; Future    4. Chronic pain of left knee  Assessment & Plan:  Has had for 3 months no injury  check xray to ortho    Orders:  -     XR knee 3 vw left non injury; Future; Expected date: 2024  -     Ambulatory Referral to Orthopedic Surgery; Future    5. Generalized anxiety disorder  Assessment & Plan:  Pt has been on clonazepam for many years using sparingly last prescription     Orders:  -     clonazePAM (KlonoPIN) 0.25 MG disintegrating tablet; Take 1 tablet (0.25 mg total) by mouth daily as needed for seizures    6. Class 2 obesity due to excess calories without serious comorbidity with body mass index (BMI) of 38.0 to 38.9 in adult  Assessment & Plan:  Discussion on diet and exercise guidelines for weight loss and  health reviewed with pt                Subjective      HPI new pt here for   initial visit and physical   Review of Systems   Constitutional:  Negative for appetite change, chills, fatigue and fever.   Respiratory:  Negative for cough, chest tightness and shortness of breath.    Cardiovascular:  Negative for chest pain, palpitations and leg swelling.   Gastrointestinal:  Negative for abdominal pain, constipation, diarrhea, nausea and vomiting.   Genitourinary:  Negative for difficulty urinating and frequency.  "  Musculoskeletal:  Positive for arthralgias (left knee on off). Negative for back pain, gait problem and neck pain.   Skin:  Positive for rash (on knuckles).   Neurological:  Negative for dizziness, weakness, light-headedness, numbness and headaches.   Hematological:  Does not bruise/bleed easily.   Psychiatric/Behavioral:  Negative for dysphoric mood and sleep disturbance. The patient is nervous/anxious (pt gets some episodes of anxiety and stress was given clonazepam 2022).        Current Outpatient Medications on File Prior to Visit   Medication Sig   • clobetasol (TEMOVATE) 0.05 % cream    • [DISCONTINUED] clonazePAM (KlonoPIN) 0.25 MG disintegrating tablet PLACE 1 TABLET (0.25 MG) ON TOP OF THE TONGUE WHERE IT WILL DISSOLVE,THEN SWALLOW 2 TIMES PER DAY   • meclizine (ANTIVERT) 25 mg tablet Take 1 tablet (25 mg total) by mouth 3 (three) times a day as needed for dizziness (Patient not taking: Reported on 1/25/2024)       Objective     /84 (BP Location: Left arm, Patient Position: Sitting, Cuff Size: Large)   Pulse 96   Temp 98.4 °F (36.9 °C) (Tympanic)   Resp 16   Ht 5' 8\" (1.727 m)   Wt 116 kg (256 lb)   SpO2 97%   BMI 38.92 kg/m²     Physical Exam  Vitals reviewed.   Constitutional:       General: He is not in acute distress.     Appearance: Normal appearance. He is not ill-appearing.   HENT:      Right Ear: Tympanic membrane, ear canal and external ear normal.      Left Ear: Tympanic membrane, ear canal and external ear normal.      Nose: Nose normal.   Eyes:      General: Lids are normal.      Extraocular Movements: Extraocular movements intact.      Conjunctiva/sclera: Conjunctivae normal.      Pupils: Pupils are equal, round, and reactive to light.   Neck:      Thyroid: No thyromegaly.      Vascular: No carotid bruit.   Cardiovascular:      Rate and Rhythm: Normal rate and regular rhythm.      Pulses: Normal pulses.      Heart sounds: Normal heart sounds.   Pulmonary:      Effort: Pulmonary " effort is normal.      Breath sounds: Normal breath sounds.   Chest:   Breasts:     Right: Normal.      Left: Normal.   Abdominal:      General: Bowel sounds are normal. There is no distension.      Palpations: Abdomen is soft. There is no mass.      Tenderness: There is no abdominal tenderness.      Hernia: No hernia is present.   Musculoskeletal:         General: Normal range of motion.      Cervical back: Full passive range of motion without pain, normal range of motion and neck supple.      Right lower leg: No edema.      Left lower leg: No edema.   Lymphadenopathy:      Cervical: No cervical adenopathy.   Skin:     General: Skin is warm and dry.      Findings: Rash (fingers palms dry scaling desquamation excoriation) present.      Comments: No abn moles   Neurological:      General: No focal deficit present.      Mental Status: He is alert and oriented to person, place, and time. Mental status is at baseline.      Cranial Nerves: No cranial nerve deficit.      Sensory: No sensory deficit.      Motor: No weakness or tremor.      Coordination: Coordination normal.      Gait: Gait normal.      Deep Tendon Reflexes: Reflexes normal.   Psychiatric:         Mood and Affect: Mood normal.         Speech: Speech normal.         Behavior: Behavior normal.       Patricia Hunter MD

## 2024-02-05 ENCOUNTER — APPOINTMENT (OUTPATIENT)
Dept: LAB | Facility: CLINIC | Age: 44
End: 2024-02-05
Payer: COMMERCIAL

## 2024-02-05 ENCOUNTER — HOSPITAL ENCOUNTER (OUTPATIENT)
Dept: RADIOLOGY | Facility: HOSPITAL | Age: 44
Discharge: HOME | End: 2024-02-05
Payer: COMMERCIAL

## 2024-02-05 DIAGNOSIS — G89.29 CHRONIC PAIN OF LEFT KNEE: ICD-10-CM

## 2024-02-05 DIAGNOSIS — Z00.00 ANNUAL PHYSICAL EXAM: ICD-10-CM

## 2024-02-05 DIAGNOSIS — M25.562 CHRONIC PAIN OF LEFT KNEE: ICD-10-CM

## 2024-02-05 DIAGNOSIS — Z11.59 NEED FOR HEPATITIS C SCREENING TEST: ICD-10-CM

## 2024-02-05 LAB
ALBUMIN SERPL BCP-MCNC: 4.7 G/DL (ref 3.5–5)
ALP SERPL-CCNC: 80 U/L (ref 34–104)
ALT SERPL W P-5'-P-CCNC: 21 U/L (ref 7–52)
ANION GAP SERPL CALCULATED.3IONS-SCNC: 7 MMOL/L
AST SERPL W P-5'-P-CCNC: 14 U/L (ref 13–39)
BASOPHILS # BLD AUTO: 0.03 THOUSANDS/ÂΜL (ref 0–0.1)
BASOPHILS NFR BLD AUTO: 0 % (ref 0–1)
BILIRUB SERPL-MCNC: 0.88 MG/DL (ref 0.2–1)
BUN SERPL-MCNC: 14 MG/DL (ref 5–25)
CALCIUM SERPL-MCNC: 9.5 MG/DL (ref 8.4–10.2)
CHLORIDE SERPL-SCNC: 104 MMOL/L (ref 96–108)
CHOLEST SERPL-MCNC: 168 MG/DL
CO2 SERPL-SCNC: 26 MMOL/L (ref 21–32)
CREAT SERPL-MCNC: 0.83 MG/DL (ref 0.6–1.3)
EOSINOPHIL # BLD AUTO: 0.17 THOUSAND/ÂΜL (ref 0–0.61)
EOSINOPHIL NFR BLD AUTO: 2 % (ref 0–6)
ERYTHROCYTE [DISTWIDTH] IN BLOOD BY AUTOMATED COUNT: 13.2 % (ref 11.6–15.1)
GFR SERPL CREATININE-BSD FRML MDRD: 108 ML/MIN/1.73SQ M
GLUCOSE P FAST SERPL-MCNC: 97 MG/DL (ref 65–99)
HCT VFR BLD AUTO: 49.2 % (ref 36.5–49.3)
HCV AB SER QL: NORMAL
HDLC SERPL-MCNC: 32 MG/DL
HGB BLD-MCNC: 16.6 G/DL (ref 12–17)
IMM GRANULOCYTES # BLD AUTO: 0.02 THOUSAND/UL (ref 0–0.2)
IMM GRANULOCYTES NFR BLD AUTO: 0 % (ref 0–2)
LDLC SERPL CALC-MCNC: 81 MG/DL (ref 0–100)
LYMPHOCYTES # BLD AUTO: 2.43 THOUSANDS/ÂΜL (ref 0.6–4.47)
LYMPHOCYTES NFR BLD AUTO: 34 % (ref 14–44)
MCH RBC QN AUTO: 28.6 PG (ref 26.8–34.3)
MCHC RBC AUTO-ENTMCNC: 33.7 G/DL (ref 31.4–37.4)
MCV RBC AUTO: 85 FL (ref 82–98)
MONOCYTES # BLD AUTO: 0.35 THOUSAND/ÂΜL (ref 0.17–1.22)
MONOCYTES NFR BLD AUTO: 5 % (ref 4–12)
NEUTROPHILS # BLD AUTO: 4.1 THOUSANDS/ÂΜL (ref 1.85–7.62)
NEUTS SEG NFR BLD AUTO: 59 % (ref 43–75)
NONHDLC SERPL-MCNC: 136 MG/DL
NRBC BLD AUTO-RTO: 0 /100 WBCS
PLATELET # BLD AUTO: 182 THOUSANDS/UL (ref 149–390)
PMV BLD AUTO: 10.9 FL (ref 8.9–12.7)
POTASSIUM SERPL-SCNC: 3.9 MMOL/L (ref 3.5–5.3)
PROT SERPL-MCNC: 7.2 G/DL (ref 6.4–8.4)
RBC # BLD AUTO: 5.8 MILLION/UL (ref 3.88–5.62)
SODIUM SERPL-SCNC: 137 MMOL/L (ref 135–147)
TRIGL SERPL-MCNC: 273 MG/DL
WBC # BLD AUTO: 7.1 THOUSAND/UL (ref 4.31–10.16)

## 2024-02-05 PROCEDURE — 36415 COLL VENOUS BLD VENIPUNCTURE: CPT

## 2024-02-05 PROCEDURE — 80061 LIPID PANEL: CPT

## 2024-02-05 PROCEDURE — 85025 COMPLETE CBC W/AUTO DIFF WBC: CPT

## 2024-02-05 PROCEDURE — 80053 COMPREHEN METABOLIC PANEL: CPT

## 2024-02-05 PROCEDURE — 73562 X-RAY EXAM OF KNEE 3: CPT

## 2024-02-05 PROCEDURE — 86803 HEPATITIS C AB TEST: CPT

## 2025-05-27 ENCOUNTER — HOSPITAL ENCOUNTER (OUTPATIENT)
Facility: HOSPITAL | Age: 45
Setting detail: OBSERVATION
Discharge: HOME | End: 2025-05-29
Attending: EMERGENCY MEDICINE | Admitting: INTERNAL MEDICINE
Payer: COMMERCIAL

## 2025-05-27 ENCOUNTER — APPOINTMENT (EMERGENCY)
Dept: CT IMAGING | Facility: HOSPITAL | Age: 45
End: 2025-05-27
Payer: COMMERCIAL

## 2025-05-27 DIAGNOSIS — R10.31 RLQ ABDOMINAL PAIN: ICD-10-CM

## 2025-05-27 DIAGNOSIS — I48.91 NEW ONSET ATRIAL FIBRILLATION (HCC): Primary | ICD-10-CM

## 2025-05-27 DIAGNOSIS — K92.2 GI BLEED: ICD-10-CM

## 2025-05-27 DIAGNOSIS — K92.1 HEMATOCHEZIA: ICD-10-CM

## 2025-05-27 LAB
ALBUMIN SERPL BCG-MCNC: 4.8 G/DL (ref 3.5–5)
ALP SERPL-CCNC: 105 U/L (ref 34–104)
ALT SERPL W P-5'-P-CCNC: 18 U/L (ref 7–52)
ANION GAP SERPL CALCULATED.3IONS-SCNC: 12 MMOL/L (ref 4–13)
APTT PPP: 23 SECONDS (ref 23–34)
APTT PPP: 32 SECONDS (ref 23–34)
APTT PPP: 47 SECONDS (ref 23–34)
AST SERPL W P-5'-P-CCNC: 21 U/L (ref 13–39)
BACTERIA UR QL AUTO: NORMAL /HPF
BASOPHILS # BLD AUTO: 0.07 THOUSANDS/ÂΜL (ref 0–0.1)
BASOPHILS NFR BLD AUTO: 1 % (ref 0–1)
BILIRUB SERPL-MCNC: 0.51 MG/DL (ref 0.2–1)
BILIRUB UR QL STRIP: NEGATIVE
BUN SERPL-MCNC: 18 MG/DL (ref 5–25)
CALCIUM SERPL-MCNC: 8.7 MG/DL (ref 8.4–10.2)
CARDIAC TROPONIN I PNL SERPL HS: 3 NG/L (ref ?–50)
CHLORIDE SERPL-SCNC: 105 MMOL/L (ref 96–108)
CLARITY UR: CLEAR
CO2 SERPL-SCNC: 21 MMOL/L (ref 21–32)
COLOR UR: ABNORMAL
CREAT SERPL-MCNC: <0.2 MG/DL (ref 0.6–1.3)
EOSINOPHIL # BLD AUTO: 0.22 THOUSAND/ÂΜL (ref 0–0.61)
EOSINOPHIL NFR BLD AUTO: 3 % (ref 0–6)
ERYTHROCYTE [DISTWIDTH] IN BLOOD BY AUTOMATED COUNT: 13.2 % (ref 11.6–15.1)
GLUCOSE SERPL-MCNC: 96 MG/DL (ref 65–140)
GLUCOSE UR STRIP-MCNC: NEGATIVE MG/DL
HCT VFR BLD AUTO: 49.3 % (ref 36.5–49.3)
HCT VFR BLD AUTO: 50.6 % (ref 36.5–49.3)
HEMOCCULT STL QL IA: POSITIVE
HGB BLD-MCNC: 17.4 G/DL (ref 12–17)
HGB BLD-MCNC: 17.7 G/DL (ref 12–17)
HGB UR QL STRIP.AUTO: ABNORMAL
IMM GRANULOCYTES # BLD AUTO: 0.02 THOUSAND/UL (ref 0–0.2)
IMM GRANULOCYTES NFR BLD AUTO: 0 % (ref 0–2)
INR PPP: 0.97 (ref 0.85–1.19)
KETONES UR STRIP-MCNC: NEGATIVE MG/DL
LACTATE SERPL-SCNC: 0.6 MMOL/L (ref 0.5–2)
LEUKOCYTE ESTERASE UR QL STRIP: NEGATIVE
LIPASE SERPL-CCNC: 45 U/L (ref 11–82)
LYMPHOCYTES # BLD AUTO: 3.17 THOUSANDS/ÂΜL (ref 0.6–4.47)
LYMPHOCYTES NFR BLD AUTO: 37 % (ref 14–44)
MAGNESIUM SERPL-MCNC: 2.6 MG/DL (ref 1.9–2.7)
MCH RBC QN AUTO: 30.3 PG (ref 26.8–34.3)
MCHC RBC AUTO-ENTMCNC: 35.9 G/DL (ref 31.4–37.4)
MCV RBC AUTO: 84 FL (ref 82–98)
MONOCYTES # BLD AUTO: 0.56 THOUSAND/ÂΜL (ref 0.17–1.22)
MONOCYTES NFR BLD AUTO: 7 % (ref 4–12)
NEUTROPHILS # BLD AUTO: 4.44 THOUSANDS/ÂΜL (ref 1.85–7.62)
NEUTS SEG NFR BLD AUTO: 52 % (ref 43–75)
NITRITE UR QL STRIP: NEGATIVE
NON-SQ EPI CELLS URNS QL MICRO: NORMAL /HPF
NRBC BLD AUTO-RTO: 0 /100 WBCS
PH UR STRIP.AUTO: 6 [PH]
PLATELET # BLD AUTO: 197 THOUSANDS/UL (ref 149–390)
PMV BLD AUTO: 11.1 FL (ref 8.9–12.7)
POTASSIUM SERPL-SCNC: 4.2 MMOL/L (ref 3.5–5.3)
PROT SERPL-MCNC: 6.9 G/DL (ref 6.4–8.4)
PROT UR STRIP-MCNC: NEGATIVE MG/DL
PROTHROMBIN TIME: 13.6 SECONDS (ref 12.3–15)
RBC # BLD AUTO: 5.85 MILLION/UL (ref 3.88–5.62)
RBC #/AREA URNS AUTO: NORMAL /HPF
SODIUM SERPL-SCNC: 138 MMOL/L (ref 135–147)
SP GR UR STRIP.AUTO: 1.02 (ref 1–1.03)
TSH SERPL DL<=0.05 MIU/L-ACNC: 3.53 UIU/ML (ref 0.45–4.5)
UROBILINOGEN UR STRIP-ACNC: <2 MG/DL
WBC # BLD AUTO: 8.48 THOUSAND/UL (ref 4.31–10.16)
WBC #/AREA URNS AUTO: NORMAL /HPF

## 2025-05-27 PROCEDURE — 85730 THROMBOPLASTIN TIME PARTIAL: CPT | Performed by: INTERNAL MEDICINE

## 2025-05-27 PROCEDURE — 85730 THROMBOPLASTIN TIME PARTIAL: CPT | Performed by: EMERGENCY MEDICINE

## 2025-05-27 PROCEDURE — 85014 HEMATOCRIT: CPT

## 2025-05-27 PROCEDURE — 83605 ASSAY OF LACTIC ACID: CPT | Performed by: EMERGENCY MEDICINE

## 2025-05-27 PROCEDURE — 36415 COLL VENOUS BLD VENIPUNCTURE: CPT | Performed by: EMERGENCY MEDICINE

## 2025-05-27 PROCEDURE — 99223 1ST HOSP IP/OBS HIGH 75: CPT | Performed by: INTERNAL MEDICINE

## 2025-05-27 PROCEDURE — 99214 OFFICE O/P EST MOD 30 MIN: CPT | Performed by: INTERNAL MEDICINE

## 2025-05-27 PROCEDURE — 99285 EMERGENCY DEPT VISIT HI MDM: CPT | Performed by: EMERGENCY MEDICINE

## 2025-05-27 PROCEDURE — 83735 ASSAY OF MAGNESIUM: CPT | Performed by: NURSE PRACTITIONER

## 2025-05-27 PROCEDURE — 80053 COMPREHEN METABOLIC PANEL: CPT | Performed by: EMERGENCY MEDICINE

## 2025-05-27 PROCEDURE — 96374 THER/PROPH/DIAG INJ IV PUSH: CPT

## 2025-05-27 PROCEDURE — 85730 THROMBOPLASTIN TIME PARTIAL: CPT

## 2025-05-27 PROCEDURE — 74174 CTA ABD&PLVS W/CONTRAST: CPT

## 2025-05-27 PROCEDURE — 84443 ASSAY THYROID STIM HORMONE: CPT | Performed by: NURSE PRACTITIONER

## 2025-05-27 PROCEDURE — 81001 URINALYSIS AUTO W/SCOPE: CPT | Performed by: EMERGENCY MEDICINE

## 2025-05-27 PROCEDURE — 83690 ASSAY OF LIPASE: CPT | Performed by: EMERGENCY MEDICINE

## 2025-05-27 PROCEDURE — 85025 COMPLETE CBC W/AUTO DIFF WBC: CPT | Performed by: EMERGENCY MEDICINE

## 2025-05-27 PROCEDURE — 93005 ELECTROCARDIOGRAM TRACING: CPT

## 2025-05-27 PROCEDURE — 85018 HEMOGLOBIN: CPT

## 2025-05-27 PROCEDURE — 99284 EMERGENCY DEPT VISIT MOD MDM: CPT

## 2025-05-27 PROCEDURE — G0328 FECAL BLOOD SCRN IMMUNOASSAY: HCPCS | Performed by: EMERGENCY MEDICINE

## 2025-05-27 PROCEDURE — 85610 PROTHROMBIN TIME: CPT | Performed by: EMERGENCY MEDICINE

## 2025-05-27 PROCEDURE — 99204 OFFICE O/P NEW MOD 45 MIN: CPT | Performed by: INTERNAL MEDICINE

## 2025-05-27 PROCEDURE — 84484 ASSAY OF TROPONIN QUANT: CPT | Performed by: EMERGENCY MEDICINE

## 2025-05-27 RX ORDER — HEPARIN SODIUM 10000 [USP'U]/100ML
3-20 INJECTION, SOLUTION INTRAVENOUS
Status: DISCONTINUED | OUTPATIENT
Start: 2025-05-27 | End: 2025-05-28

## 2025-05-27 RX ORDER — HEPARIN SODIUM 1000 [USP'U]/ML
4000 INJECTION, SOLUTION INTRAVENOUS; SUBCUTANEOUS ONCE
Status: COMPLETED | OUTPATIENT
Start: 2025-05-27 | End: 2025-05-27

## 2025-05-27 RX ORDER — KETOROLAC TROMETHAMINE 30 MG/ML
15 INJECTION, SOLUTION INTRAMUSCULAR; INTRAVENOUS ONCE
Status: COMPLETED | OUTPATIENT
Start: 2025-05-27 | End: 2025-05-27

## 2025-05-27 RX ORDER — HEPARIN SODIUM 1000 [USP'U]/ML
2000 INJECTION, SOLUTION INTRAVENOUS; SUBCUTANEOUS EVERY 6 HOURS PRN
Status: DISCONTINUED | OUTPATIENT
Start: 2025-05-27 | End: 2025-05-28

## 2025-05-27 RX ORDER — HEPARIN SODIUM 1000 [USP'U]/ML
4000 INJECTION, SOLUTION INTRAVENOUS; SUBCUTANEOUS EVERY 6 HOURS PRN
Status: DISCONTINUED | OUTPATIENT
Start: 2025-05-27 | End: 2025-05-28

## 2025-05-27 RX ORDER — ACETAMINOPHEN 325 MG/1
975 TABLET ORAL ONCE
Status: COMPLETED | OUTPATIENT
Start: 2025-05-27 | End: 2025-05-27

## 2025-05-27 RX ADMIN — KETOROLAC TROMETHAMINE 15 MG: 30 INJECTION, SOLUTION INTRAMUSCULAR; INTRAVENOUS at 02:30

## 2025-05-27 RX ADMIN — IOHEXOL 100 ML: 350 INJECTION, SOLUTION INTRAVENOUS at 04:43

## 2025-05-27 RX ADMIN — HEPARIN SODIUM 11.1 UNITS/KG/HR: 10000 INJECTION, SOLUTION INTRAVENOUS at 09:03

## 2025-05-27 RX ADMIN — HEPARIN SODIUM 4000 UNITS: 1000 INJECTION, SOLUTION INTRAVENOUS; SUBCUTANEOUS at 15:21

## 2025-05-27 RX ADMIN — HEPARIN SODIUM 2000 UNITS: 1000 INJECTION, SOLUTION INTRAVENOUS; SUBCUTANEOUS at 22:06

## 2025-05-27 RX ADMIN — ACETAMINOPHEN 975 MG: 325 TABLET ORAL at 02:21

## 2025-05-27 RX ADMIN — HEPARIN SODIUM 4000 UNITS: 1000 INJECTION, SOLUTION INTRAVENOUS; SUBCUTANEOUS at 09:02

## 2025-05-27 NOTE — H&P
H&P - Hospitalist   Name: Guicho Sanabria 45 y.o. male I MRN: [id number]  Unit/Bed#: S -01 I Date of Admission: 5/27/2025   Date of Service: 5/27/2025 I Hospital Day: 0     Assessment & Plan  GI bleed  Hematochezia      Patient presents with GI bleed since yesterday  Had a few loose stools with drops of blood and RLQ pain  Presented to the ED for evaluation  Positive FOBT  With new onset A-fib, concern for mesenteric ischemia  CTA abdomen pelvis done in ED showed no acute abnormalities  Hemoglobin has been very stable at 17.7    Recent Labs     05/27/25  0230   HGB 17.7*         Plan:  GI consulted, appreciate recs  Will continue clear liquid diet until GI evaluation  Will check repeat hemoglobin at 2 PM as he is now on IV heparin for his A-fib    A-fib (HCC)    Patient has new onset A-fibStates he has felt palpitations in the past which resolved rapidly after onset  Has never been diagnosed with A-fib in the past  In the ED, EKG showed new onset A-fib  Was started on IV heparin    Plan:  Cardiology consulted, appreciate recs  Telemetry  Echo ordered        VTE Pharmacologic Prophylaxis: VTE Score: 2 Low Risk (Score 0-2) - Encourage Ambulation.  Code Status: Level 1 - Full Code   Discussion with family: Updated  (wife) at bedside.    Anticipated Length of Stay: Patient will be admitted on an observation basis with an anticipated length of stay of less than 2 midnights secondary to GI bleed and new onset afib.    History of Present Illness   Chief Complaint: gi bleed    Guicho Sanabria is a 45 y.o. male with no significant PMHx who presents with GI bleed of 1 day.  Patient states that he had some right lower quadrant abdominal pain start in the afternoon/evening time which prompted him to go to the bathroom.  He states he had 4-5 bowel movements within the span of an hour, all of them loose.  Later on, he had further bowel movements with some blood in the stool.  With this blood and the RLQ  "abdominal pain, he decided to come to the ED to be evaluated.  In the ED, FOBT was positive.  He was also found to be in new onset A-fib on EKG.  He had a CTA abdomen pelvis completed which showed no acute abnormalities.  At this time, he does not complain of any further pain or bowel movements.  He has no history of constipation or diarrhea in the past.  He states that he drinks \"a couple of beers a couple of times a week\".  He has not taking any NSAIDs recently and he is a non-smoker.    Review of Systems   Constitutional:  Negative for chills, fatigue and fever.   Respiratory:  Negative for cough, shortness of breath and wheezing.    Cardiovascular:  Negative for chest pain, palpitations and leg swelling.   Gastrointestinal:  Positive for abdominal pain (RLQ), blood in stool and diarrhea. Negative for constipation, nausea and vomiting.   Genitourinary:  Negative for dysuria, frequency, hematuria and urgency.   Musculoskeletal:  Negative for back pain, myalgias and neck pain.   Skin:  Negative for rash.   Neurological:  Negative for dizziness, light-headedness and headaches.   Psychiatric/Behavioral:  Negative for confusion.        Historical Information   Past Medical History[1]  Past Surgical History[2]  Social History[3]  E-Cigarette/Vaping    E-Cigarette Use Never User      E-Cigarette/Vaping Substances    Nicotine No     THC No     CBD No     Flavoring No     Other No     Unknown No      Family History[4]  Social History:  Marital Status: /Civil Union   Occupation: Unknown  Patient Pre-hospital Living Situation: Home  Patient Pre-hospital Level of Mobility: walks  Patient Pre-hospital Diet Restrictions: None    Meds/Allergies   I have reviewed home medications with patient personally.  Prior to Admission medications    Medication Sig Start Date End Date Taking? Authorizing Provider   clobetasol (TEMOVATE) 0.05 % cream  4/20/22   Historical Provider, MD   clonazePAM (KlonoPIN) 0.25 MG disintegrating " tablet Take 1 tablet (0.25 mg total) by mouth daily as needed for seizures 1/25/24   Patricia Hunter MD   meclizine (ANTIVERT) 25 mg tablet Take 1 tablet (25 mg total) by mouth 3 (three) times a day as needed for dizziness  Patient not taking: Reported on 1/25/2024 7/2/23   Roberto Chakraborty MD     No Known Allergies    Objective :  Temp:  [97.7 °F (36.5 °C)-98.1 °F (36.7 °C)] 98.1 °F (36.7 °C)  HR:  [79-92] 79  BP: (114-132)/(60-92) 127/92  Resp:  [16-18] 18  SpO2:  [96 %-97 %] 96 %  O2 Device: None (Room air)    Physical Exam  Vitals reviewed.   Constitutional:       General: He is not in acute distress.     Appearance: Normal appearance. He is not ill-appearing, toxic-appearing or diaphoretic.   HENT:      Head: Normocephalic and atraumatic.     Eyes:      General: No scleral icterus.     Extraocular Movements: Extraocular movements intact.       Cardiovascular:      Rate and Rhythm: Normal rate. Rhythm irregular.      Pulses: Normal pulses.      Heart sounds: No murmur heard.     No friction rub. No gallop.   Pulmonary:      Effort: Pulmonary effort is normal. No respiratory distress.      Breath sounds: No wheezing, rhonchi or rales.   Abdominal:      General: Abdomen is flat. There is no distension.      Tenderness: There is abdominal tenderness (RLQ). There is no guarding or rebound.     Musculoskeletal:         General: Normal range of motion.      Cervical back: Normal range of motion and neck supple.      Right lower leg: No edema.      Left lower leg: No edema.     Skin:     General: Skin is warm.      Coloration: Skin is not jaundiced.     Neurological:      General: No focal deficit present.      Mental Status: He is alert and oriented to person, place, and time.     Psychiatric:         Mood and Affect: Mood normal.          Lines/Drains:            Lab Results: I have reviewed the following results:  Results from last 7 days   Lab Units 05/27/25  0230   WBC Thousand/uL 8.48   HEMOGLOBIN g/dL 17.7*    HEMATOCRIT % 49.3   PLATELETS Thousands/uL 197   SEGS PCT % 52   LYMPHO PCT % 37   MONO PCT % 7   EOS PCT % 3     Results from last 7 days   Lab Units 05/27/25  0230   SODIUM mmol/L 138   POTASSIUM mmol/L 4.2   CHLORIDE mmol/L 105   CO2 mmol/L 21   BUN mg/dL 18   CREATININE mg/dL <0.20*   ANION GAP mmol/L 12   CALCIUM mg/dL 8.7   ALBUMIN g/dL 4.8   TOTAL BILIRUBIN mg/dL 0.51   ALK PHOS U/L 105*   ALT U/L 18   AST U/L 21   GLUCOSE RANDOM mg/dL 96     Results from last 7 days   Lab Units 05/27/25  0809   INR  0.97         Lab Results   Component Value Date    HGBA1C 5.7 (H) 04/22/2022     Results from last 7 days   Lab Units 05/27/25  0245   LACTIC ACID mmol/L 0.6       Imaging Results Review: I reviewed radiology reports from this admission including: CT abdomen/pelvis.  Other Study Results Review: EKG was reviewed.     Administrative Statements       ** Please Note: This note has been constructed using a voice recognition system. **         [1] No past medical history on file.  [2]   Past Surgical History:  Procedure Laterality Date    NO PAST SURGERIES      [place] LAPAROSCOPIC APPENDECTOMY N/A 4/17/2017    Procedure: APPENDECTOMY LAPAROSCOPIC;  Surgeon: Miguel Monaco DO;  Location: [facility];  Service: General   [3]   Social History  Tobacco Use    Smoking status: Never    Smokeless tobacco: Never   Vaping Use    Vaping status: Never Used   Substance and Sexual Activity    Alcohol use: Yes     Comment: socially    Drug use: No    Sexual activity: Not Currently   [4]   Family History  Problem Relation Name Age of Onset    No Known Problems Mother      No Known Problems Father

## 2025-05-27 NOTE — ASSESSMENT & PLAN NOTE
Patient presents with GI bleed since yesterday  Had a few loose stools with drops of blood and RLQ pain  Presented to the ED for evaluation  Positive FOBT  With new onset A-fib, concern for mesenteric ischemia  CTA abdomen pelvis done in ED showed no acute abnormalities  Hemoglobin has been very stable at 17.7    Recent Labs     05/27/25  0230   HGB 17.7*         Plan:  GI consulted, appreciate recs  Will continue clear liquid diet until GI evaluation  Will check repeat hemoglobin at 2 PM as he is now on IV heparin for his A-fib

## 2025-05-27 NOTE — ASSESSMENT & PLAN NOTE
Incidentally found on EKG on presentation to the emergency department for blood in stool.  Does admit to some palpitations but short-lived over the past week. Asymptomatic now with controlled HRs resting in bed.   No prior known history of a A-fib  Rates are controlled not on any AV estuardo agents  Currently on IV heparin    Continue telemetry  Check echocardiogram  Add mag level and TSH to labs  Add low dose AV estuardo agent.   CHADS-Vasc score 0; will make recommendations for long term AC once workup completed.   Recommend OP sleep study. Decrease alcohol intake.

## 2025-05-27 NOTE — CONSULTS
Consultation - Cardiology Team One  Guicho Sanabria 45 y.o. male MRN: [id number]  Unit/Bed#: S -01 Encounter: [id number]    Inpatient consult to Cardiology  Consult performed by: GABINO Rai  Consult ordered by: Everardo Cardoza DO          Physician Requesting Consult: Eugenia Castro MD  Reason for Consult / Principal Problem: new onset atrial fibrillation      Assessment/ Plan:     Assessment & Plan  GI bleed    Pt reported diarrhea and blood in stool for one day.   Hgb 17.7 on presentation  GI consult noted, recommending colonoscopy once cardiac workup completed, monitor H&H and stool output/diarrhea.  A-fib (HCC)  Incidentally found on EKG on presentation to the emergency department for blood in stool.  Does admit to some palpitations but short-lived over the past week. Asymptomatic now with controlled HRs resting in bed.   No prior known history of a A-fib  Rates are controlled not on any AV estuardo agents  Currently on IV heparin    Continue telemetry  Check echocardiogram  Add mag level and TSH to labs  Add low dose AV estuardo agent.   CHADS-Vasc score 0; will make recommendations for long term AC once workup completed.   Recommend OP sleep study. Decrease alcohol intake.      History of Present Illness     HPI: Guicho Sanabria is a 45 y.o. year old male without significant medical history who presented to the emergency department today with complaints abdominal pain diarrhea and bloody stool.  Symptoms started yesterday.    On presentation he was hemodynamically stable to blood pressure 121/87, afebrile, SpO2 96% on room air.  EKG obtained showed rate controlled atrial fibrillation with a ventricular rate of 80 bpm.  H/H 17/49  BUN of 18, creatinine less than 0.20  Single high-sensitivity troponin negative at 3  CT of the abdomen not show any acute findings.    For the new onset atrial fibrillation patient was started on intravenous heparin.  GI was consulted regarding bloody stool; they are  recommending colonoscopy once cardiac workup for new onset A-fib is completed.    Patient denies any prior history of CHF, CAD, MI or arrhythmia.  No prior cardiac workup.  He has felt some intermittent palpitations in the past weeks.  He drinks 1 8 ounce cup of coffee daily. Drinks 2-3 beers most days after work. Lifelong non-smoker. Denies any other drug use. Snores loudly at nighttime.   Works as a ; no heavy lifting but moving around a lot throughout work day; has never had chest pain.     Pt speaks and understands english as his second language; his wife speaks only Lao and is present in room; we used Lao interpretor # [id number] so she could hear and participate in conversations.     EKG reviewed personally:   5/27/2025  Atrial fibrillation  Bundle branch block  Ventricular rate 80 bpm    Telemetry reviewed personally:   Rate controlled atrial fibrillation, no significant events    Review of Systems   Constitutional: Negative for decreased appetite and fever.   Cardiovascular:  Positive for palpitations. Negative for chest pain, dyspnea on exertion, leg swelling and orthopnea.   Respiratory:  Negative for cough and shortness of breath.    Gastrointestinal:  Positive for abdominal pain (improved) and diarrhea. Negative for nausea and vomiting.   Genitourinary:  Negative for dysuria.   Neurological:  Negative for dizziness and light-headedness.   Psychiatric/Behavioral:  Negative for altered mental status.    All other systems reviewed and are negative.    Historical Information   Past Medical History[1]  Past Surgical History[2]  Social History     Substance and Sexual Activity   Alcohol Use Yes    Comment: socially     Social History     Substance and Sexual Activity   Drug Use No     Tobacco Use History[3]  Family History: Family history non-contributory    Meds/Allergies   current meds:   Current Facility-Administered Medications:     heparin (porcine) 25,000 units in 0.45% NaCl 250 mL  infusion (premix), Titrated, Last Rate: 11.1 Units/kg/hr (25 0903)    heparin (porcine) injection 2,000 Units, Q6H PRN    heparin (porcine) injection 4,000 Units, Q6H PRN  heparin (porcine), 3-20 Units/kg/hr (Order-Specific), Last Rate: 11.1 Units/kg/hr (25 0903)      Allergies[4]    Objective   Vitals: Blood pressure 127/92, pulse 79, temperature 98.1 °F (36.7 °C), resp. rate 18, weight 116 kg (255 lb 1.2 oz), SpO2 96%.,     Body mass index is 38.78 kg/m².,     Systolic (24hrs), Av , Min:114 , Max:132     Diastolic (24hrs), Av, Min:60, Max:92    No intake or output data in the 24 hours ending 25 1327  Weight (last 2 days)       Date/Time Weight    25 0248 116 (255.07)          Invasive Devices       Peripheral Intravenous Line  Duration             Peripheral IV 25 Right Antecubital <1 day                  Physical Exam  Vitals reviewed.   Constitutional:       General: He is not in acute distress.     Appearance: Normal appearance. He is obese.   HENT:      Head: Normocephalic.      Mouth/Throat:      Mouth: Mucous membranes are moist.     Cardiovascular:      Rate and Rhythm: Normal rate. Rhythm irregularly irregular.      Heart sounds: S1 normal and S2 normal. No murmur heard.  Pulmonary:      Effort: Pulmonary effort is normal.      Breath sounds: Normal breath sounds. No wheezing or rales.      Comments: oN RA    Musculoskeletal:      Right lower leg: No edema.      Left lower leg: No edema.     Skin:     General: Skin is warm.     Neurological:      Mental Status: He is alert and oriented to person, place, and time.     Psychiatric:         Mood and Affect: Mood normal.       LABORATORY RESULTS:      CBC with diff:   Results from last 7 days   Lab Units 25  0230   WBC Thousand/uL 8.48   HEMOGLOBIN g/dL 17.7*   HEMATOCRIT % 49.3   MCV fL 84   PLATELETS Thousands/uL 197   RBC Million/uL 5.85*   MCH pg 30.3   MCHC g/dL 35.9   RDW % 13.2   MPV fL 11.1   NRBC AUTO /100  "WBCs 0     CMP:  Results from last 7 days   Lab Units 05/27/25  0230   POTASSIUM mmol/L 4.2   CHLORIDE mmol/L 105   CO2 mmol/L 21   BUN mg/dL 18   CREATININE mg/dL <0.20*   CALCIUM mg/dL 8.7   AST U/L 21   ALT U/L 18   ALK PHOS U/L 105*     BMP:  Results from last 7 days   Lab Units 05/27/25  0230   POTASSIUM mmol/L 4.2   CHLORIDE mmol/L 105   CO2 mmol/L 21   BUN mg/dL 18   CREATININE mg/dL <0.20*   CALCIUM mg/dL 8.7     Results from last 7 days   Lab Units 05/27/25  0809   INR  0.97     Lipid Profile:   No results found for: \"CHOL\"  Lab Results   Component Value Date    HDL 32 (L) 02/05/2024    HDL 37 (L) 04/22/2022     Lab Results   Component Value Date    LDLCALC 81 02/05/2024    LDLCALC 112 (H) 04/22/2022     Lab Results   Component Value Date    TRIG 273 (H) 02/05/2024    TRIG 188 (H) 04/22/2022     Imaging:CTA abdomen pelvis w wo contrast  Result Date: 5/27/2025  Narrative: CT ANGIOGRAM OF THE ABDOMEN AND PELVIS WITH AND WITHOUT IV CONTRAST INDICATION: Eval generalized abd pain, worse in the RLQ, Hx of appendectomy, now has hematochezia, new onset afib. COMPARISON: CT 4/17/2017 TECHNIQUE: CT angiogram examination of the abdomen and pelvis was performed according to standard protocol. This examination, like all CT scans performed in the [facility], was performed utilizing techniques to minimize radiation dose exposure, including the use of iterative reconstruction and automated exposure control. Contrast as well as noncontrast images were obtained. Rad dose 2278 mGy-cm. IV Contrast: 100 mL of iohexol (OMNIPAQUE) Enteric Contrast: Not administered. FINDINGS: VASCULAR STRUCTURES: No aortic dissection, or aneurysm. No evidence of atherosclerotic disease. OTHER FINDINGS ABDOMEN LOWER CHEST: No clinically significant abnormality in the visualized lower chest. LIVER/BILIARY TREE: Unremarkable. GALLBLADDER: No calcified gallstones. No pericholecystic inflammatory change. SPLEEN: Unremarkable. " PANCREAS: Unremarkable. ADRENAL GLANDS: Unremarkable. KIDNEYS/URETERS: Unremarkable. No hydronephrosis. STOMACH AND BOWEL: Colonic diverticulosis without findings of acute diverticulitis. APPENDIX: No findings to suggest appendicitis. ABDOMINOPELVIC CAVITY: No ascites. No pneumoperitoneum. No lymphadenopathy. PELVIS REPRODUCTIVE ORGANS: Unremarkable for patient's age. URINARY BLADDER: Unremarkable. ABDOMINAL WALL/INGUINAL REGIONS: Unremarkable. BONES: No acute fracture or suspicious osseous lesion.     Impression: 1.  No acute aortic syndrome 2.  Diverticulosis without diverticulitis Workstation performed: [id number]     Assessment  Principal Problem:    GI bleed  Active Problems:    A-fib (HCC)    Hematochezia    Thank you for allowing us to participate in this patient's care. This pt will follow up with Dr Goyal once discharged.     Counseling / Coordination of Care  Total floor / unit time spent today 45 minutes.  Greater than 50% of total time was spent with the patient and / or family counseling and / or coordination of care.  A description of the counseling / coordination of care: Review of history, current assessment, development of a plan.      Code Status: Level 1 - Full Code    ** Please Note: Dragon 360 Dictation voice to text software may have been used in the creation of this document. **          [1] No past medical history on file.  [2]   Past Surgical History:  Procedure Laterality Date    NO PAST SURGERIES      [place] LAPAROSCOPIC APPENDECTOMY N/A 4/17/2017    Procedure: APPENDECTOMY LAPAROSCOPIC;  Surgeon: Miguel Monaco DO;  Location: [facility];  Service: General   [3]   Social History  Tobacco Use   Smoking Status Never   Smokeless Tobacco Never   [4] No Known Allergies

## 2025-05-27 NOTE — LETTER
[facility] 2ND FLOOR MED SURG UNIT  [address] [facility] [facility]  [place] [address]  Dept: [phone]    May 29, 2025     Patient: Guicho Sanabria   YOB: 1980   Date of Visit: 5/27/2025       To Whom it May Concern:    Guicho Sanabria is under my professional care. He was seen in the hospital from 5/27/2025 to 05/29/25. He may return to work on 06/02/2025 as tolerated.    If you have any questions or concerns, please don't hesitate to call.         Sincerely,          Justin uHang, DO

## 2025-05-27 NOTE — ASSESSMENT & PLAN NOTE
Patient has new onset A-fibStates he has felt palpitations in the past which resolved rapidly after onset  Has never been diagnosed with A-fib in the past  In the ED, EKG showed new onset A-fib  Was started on IV heparin    Plan:  Cardiology consulted, appreciate recs  Telemetry  Echo ordered

## 2025-05-27 NOTE — ASSESSMENT & PLAN NOTE
Pt reported diarrhea and blood in stool for one day.   Hgb 17.7 on presentation  GI consult noted, recommending colonoscopy once cardiac workup completed, monitor H&H and stool output/diarrhea.

## 2025-05-27 NOTE — ASSESSMENT & PLAN NOTE
Patient reports acute onset diarrhea and right sided abdominal pain that started yesterday. Last episode 1230 contained some red blood.  CT unremarkable.  Hemoglobin normal.  No chronic symptoms. Suspect infectious etiology, appears less likely ischemic, inflammatory or lesion.    -Patient with new diagnosis of A fib on heparin drip, ECHO ordered. Would await complete cardiology workup prior to any endoscopic evaluation.  - Patient should have a colonoscopy to confirm etiology of underlying rectal bleeding. Timing TBD.    -If any further diarrhea, recommend stool infectious studies.  -Monitor stool output  -Monitor Hgb  -Continue clear liquid diet for now  -Currently on heparin drip.

## 2025-05-27 NOTE — LETTER
[facility] 2ND FLOOR MED SURG UNIT  [address] [facility]  [place] [address]  Dept: [phone]    May 29, 2025     Patient: Guicho Sanabria   YOB: 1980   Date of Visit: 5/27/2025       To Whom it May Concern:    Guicho Sanabria is under my professional care. He was seen in the hospital from 5/27/2025 to 05/29/25. He {Return to school/sport/work:[id number]}.    If you have any questions or concerns, please don't hesitate to call.         Sincerely,          Deanna Greer MD

## 2025-05-27 NOTE — ED PROVIDER NOTES
Time reflects when diagnosis was documented in both MDM as applicable and the Disposition within this note       Time User Action Codes Description Comment    5/27/2025  7:03 AM Bill Guadarrama Add [I48.91] New onset atrial fibrillation (HCC)     5/27/2025  7:03 AM Bill Guadarrama Add [K92.1] Hematochezia     5/27/2025  7:03 AM Bill Guadarrama Add [R10.31] RLQ abdominal pain     5/27/2025 11:34 AM Everardo Cardoza Add [K92.2] GI bleed           ED Disposition       ED Disposition   Admit    Condition   Stable    Date/Time   Tue May 27, 2025  7:26 AM    Comment   Case was discussed with SLIM and the patient's admission status was agreed to be Admission Status: observation status to the service of Dr. Rodriguez .               Assessment & Plan       Medical Decision Making  45-year-old male presents with 2 days of abdominal pain, mostly in the right lower quadrant, but a history of an appendectomy, and some hematochezia, will be evaluated for small bowel obstruction although my suspicion for this is low given that he is still having bowel movements, as well as nephrolithiasis, again this does not explain his hematochezia, and also colitis, however it would be a bit unusual having right-sided abdominal pain with a colitis, I do not see any signs of hemorrhoids on my physical exam and his stools were brown on my exam, so the pathology will be investigated as well with a CT.  The patient will be given analgesia, at this time is not complaining of significant nausea, disposition is pending workup.    Patient is found to be in a new onset A-fib, the patient workup was otherwise unremarkable, with no signs of mesenteric ischemia, the patient will be admitted for further management of his new onset A-fib, and while he is complaining of GI bleeding, he has brown stools, and they are guaiac positive.  I believe the benefits of initiating thrombolytics outweighs the risks, and if he does begin to have any brisk GI  bleed the heparin can be reversed with protamine sulfate.  The risk versus benefit was discussed with the patient, and these concerns were discussed with the admitting team for continuity of care.    Amount and/or Complexity of Data Reviewed  Labs: ordered.  Radiology: ordered.    Risk  OTC drugs.  Prescription drug management.  Decision regarding hospitalization.        ED Course as of 05/29/25 0711   Tue May 27, 2025   0312 The patient is noted to have A-fib on EKG, given the new onset A-fib the patient will be admitted, and his CT will be changed to a CTA to look for possible mesenteric ischemia.       Medications   acetaminophen (TYLENOL) tablet 975 mg (975 mg Oral Given 5/27/25 0221)   ketorolac (TORADOL) injection 15 mg (15 mg Intravenous Given 5/27/25 0230)   iohexol (OMNIPAQUE) 350 MG/ML injection (MULTI-DOSE) 100 mL (100 mL Intravenous Given 5/27/25 0443)   heparin (porcine) injection 4,000 Units (4,000 Units Intravenous Given 5/27/25 0902)       ED Risk Strat Scores                    No data recorded                            History of Present Illness       Chief Complaint   Patient presents with    Abdominal Pain     Lower abdominal pain starting yesterday accompanied by 5 bowel movements. Today noticed dark red blood in the last few bowel movements. +nausea, denies vomiting and fevers.        Past Medical History[1]   Past Surgical History[2]   Family History[3]   Social History[4]   E-Cigarette/Vaping    E-Cigarette Use Never User       E-Cigarette/Vaping Substances    Nicotine No     THC No     CBD No     Flavoring No     Other No     Unknown No       I have reviewed and agree with the history as documented.     45-year-old male presents with 2 days of abdominal pain that he describes as generalized abdominal pain, but worse in the right lower quadrant, and he is also noted that he has had some blood in his stools.  The patient reports he has had some nausea but no vomiting, no constipation or  diarrhea, no dysuria, hematuria, and no fever, headache, dizziness, chest pain, cough, shortness of breath, and no other complaints.  Patient has had no recent trauma, is not on any blood thinners, drinks occasionally, has a history of an appendectomy, does not smoke or use drugs.        Review of Systems   Constitutional:  Negative for fever.   Respiratory:  Negative for cough and shortness of breath.    Cardiovascular:  Negative for chest pain.   Gastrointestinal:  Positive for abdominal pain, blood in stool and nausea. Negative for diarrhea and vomiting.   Genitourinary:  Negative for dysuria and hematuria.   Neurological:  Negative for light-headedness and headaches.           Objective       ED Triage Vitals   Temperature Pulse Blood Pressure Respirations SpO2 Patient Position - Orthostatic VS   05/27/25 0151 05/27/25 0151 05/27/25 0151 05/27/25 0151 05/27/25 0151 05/27/25 0151   97.7 °F (36.5 °C) 90 121/87 18 96 % Lying      Temp Source Heart Rate Source BP Location FiO2 (%) Pain Score    05/27/25 0151 05/27/25 0151 05/27/25 0151 -- 05/27/25 0221    Oral Monitor Right arm  5      Vitals      Date and Time Temp Pulse SpO2 Resp BP Pain Score FACES Pain Rating User   05/28/25 2214 97.9 °F (36.6 °C) 56 97 % -- 115/78 -- -- DII   05/28/25 2000 -- -- -- -- -- No Pain --    05/28/25 1442 97.8 °F (36.6 °C) 55 96 % -- 132/82 -- -- DII   05/28/25 1102 -- 67 -- -- 116/74 -- -- KH   05/28/25 0850 -- 66 -- -- 113/73 -- -- KA   05/28/25 0800 -- -- -- -- -- No Pain -- KH   05/28/25 0743 97.7 °F (36.5 °C) 66 97 % 18 113/73 -- -- DII   05/28/25 0534 -- -- -- -- -- 4 --    05/28/25 0218 97.9 °F (36.6 °C) 68 97 % -- 125/79 -- -- DII   05/27/25 2212 97.6 °F (36.4 °C) 80 96 % -- 109/74 -- -- DII   05/27/25 1909 -- -- -- -- -- No Pain -- ZJ   05/27/25 1909 98.2 °F (36.8 °C) 67 96 % -- 143/87 -- -- DII   05/27/25 1528 98.2 °F (36.8 °C) 79 95 % 18 122/88 -- -- DII   05/27/25 1005 98.1 °F (36.7 °C) 79 96 % -- 127/92 -- -- DII    05/27/25 1000 -- -- -- -- -- No Pain -- KH   05/27/25 0948 97.9 °F (36.6 °C) 92 97 % 18 130/84 No Pain -- LH   05/27/25 0908 -- 84 -- 16 132/88 -- -- LAB   05/27/25 0630 -- 86 97 % -- 124/66 -- -- DB   05/27/25 0600 -- 82 96 % -- 114/84 -- -- JH   05/27/25 0507 -- -- -- -- -- 2 -- MD   05/27/25 0400 -- 85 96 % 18 119/70 -- -- MD   05/27/25 0330 -- 90 96 % 18 117/60 -- -- MD   05/27/25 0221 -- -- -- -- -- 5 -- MD   05/27/25 0151 97.7 °F (36.5 °C) 90 96 % 18 121/87 -- -- RM            Physical Exam  Vitals and nursing note reviewed.   Constitutional:       General: He is not in acute distress.     Appearance: Normal appearance.   HENT:      Head: Normocephalic and atraumatic.      Right Ear: External ear normal.      Left Ear: External ear normal.      Mouth/Throat:      Mouth: Mucous membranes are moist.      Pharynx: Oropharynx is clear.     Eyes:      Conjunctiva/sclera: Conjunctivae normal.      Pupils: Pupils are equal, round, and reactive to light.       Cardiovascular:      Rate and Rhythm: Normal rate. Rhythm irregular.   Pulmonary:      Effort: Pulmonary effort is normal. No respiratory distress.   Abdominal:      General: Abdomen is flat. There is no distension.      Tenderness: There is abdominal tenderness in the right lower quadrant and suprapubic area. There is no right CVA tenderness or left CVA tenderness. Positive signs include Rovsing's sign and McBurney's sign.     Musculoskeletal:         General: No deformity. Normal range of motion.      Cervical back: Normal range of motion.     Skin:     General: Skin is warm and dry.     Neurological:      General: No focal deficit present.      Mental Status: He is alert and oriented to person, place, and time.     Psychiatric:         Mood and Affect: Mood normal.         Behavior: Behavior normal.         Results Reviewed       Procedure Component Value Units Date/Time    APTT six (6) hours after Heparin bolus/drip initiation or dosing change [[id number]]   (Normal) Collected: 05/27/25 1420    Lab Status: Final result Specimen: Blood from Arm, Left Updated: 05/27/25 1501     PTT 32 seconds     Narrative:      Verified by repeat analysis    TSH, 3rd generation [[id number]]  (Normal) Collected: 05/27/25 0230    Lab Status: Final result Specimen: Blood from Arm, Right Updated: 05/27/25 1439     TSH 3RD GENERATON 3.528 uIU/mL     Narrative:      Specimen Lipemic.    Magnesium [[id number]]  (Normal) Collected: 05/27/25 0230    Lab Status: Final result Specimen: Blood from Arm, Right Updated: 05/27/25 1400     Magnesium 2.6 mg/dL     Narrative:      Specimen Lipemic.    APTT [[id number]]  (Normal) Collected: 05/27/25 0809    Lab Status: Final result Specimen: Blood from Arm, Right Updated: 05/27/25 0824     PTT 23 seconds     Protime-INR [[id number]]  (Normal) Collected: 05/27/25 0809    Lab Status: Final result Specimen: Blood from Arm, Right Updated: 05/27/25 0824     Protime 13.6 seconds      INR 0.97    Narrative:      INR Therapeutic Range    Indication                                             INR Range      Atrial Fibrillation                                               2.0-3.0  Hypercoagulable State                                    2.0.2.3  Left Ventricular Asist Device                            2.0-3.0  Mechanical Heart Valve                                  -    Aortic(with afib, MI, embolism, HF, LA enlargement,    and/or coagulopathy)                                     2.0-3.0 (2.5-3.5)     Mitral                                                             2.5-3.5  Prosthetic/Bioprosthetic Heart Valve               2.0-3.0  Venous thromboembolism (VTE: VT, PE        2.0-3.0    Comprehensive metabolic panel [[id number]]  (Abnormal) Collected: 05/27/25 0230    Lab Status: Final result Specimen: Blood from Arm, Right Updated: 05/27/25 0401     Sodium 138 mmol/L      Potassium 4.2 mmol/L      Chloride 105 mmol/L      CO2 21 mmol/L      ANION GAP 12 mmol/L      BUN  18 mg/dL      Creatinine <0.20 mg/dL      Glucose 96 mg/dL      Calcium 8.7 mg/dL      AST 21 U/L      ALT 18 U/L      Alkaline Phosphatase 105 U/L      Total Protein 6.9 g/dL      Albumin 4.8 g/dL      Total Bilirubin 0.51 mg/dL      eGFR --    Narrative:      Specimen Lipemic.    Lipase [[id number]]  (Normal) Collected: 05/27/25 0230    Lab Status: Final result Specimen: Blood from Arm, Right Updated: 05/27/25 0401     Lipase 45 u/L     Narrative:      Specimen Lipemic.    Lactic acid, plasma (w/reflex if result > 2.0) [[id number]]  (Normal) Collected: 05/27/25 0245    Lab Status: Final result Specimen: Blood from Arm, Right Updated: 05/27/25 0347     LACTIC ACID 0.6 mmol/L     Narrative:      Specimen Lipemic.  Result may be elevated if tourniquet was used during collection.    iFOBT (FIT) [[id number]]  (Abnormal) Collected: 05/27/25 0317    Lab Status: Final result Specimen: Stool from Per Rectum Updated: 05/27/25 0333     OCCULT BLD, FECAL IMMUNOLOGICAL Positive    Narrative:        Performed by Fecal Immunochemical Test.    HS Troponin 0hr (reflex protocol) [[id number]]  (Normal) Collected: 05/27/25 0230    Lab Status: Final result Specimen: Blood from Arm, Right Updated: 05/27/25 0310     hs TnI 0hr 3 ng/L     Urine Microscopic [[id number]]  (Normal) Collected: 05/27/25 0226    Lab Status: Final result Specimen: Urine, Clean Catch Updated: 05/27/25 0300     RBC, UA 1-2 /hpf      WBC, UA None Seen /hpf      Epithelial Cells None Seen /hpf      Bacteria, UA None Seen /hpf     UA w Reflex to Microscopic w Reflex to Culture [[id number]]  (Abnormal) Collected: 05/27/25 0226    Lab Status: Final result Specimen: Urine, Clean Catch Updated: 05/27/25 0259     Color, UA Light Yellow     Clarity, UA Clear     Specific Gravity, UA 1.025     pH, UA 6.0     Leukocytes, UA Negative     Nitrite, UA Negative     Protein, UA Negative mg/dl      Glucose, UA Negative mg/dl      Ketones, UA Negative mg/dl      Urobilinogen, UA <2.0  mg/dl      Bilirubin, UA Negative     Occult Blood, UA Small    CBC and differential [[id number]]  (Abnormal) Collected: 05/27/25 0230    Lab Status: Final result Specimen: Blood from Arm, Right Updated: 05/27/25 0244     WBC 8.48 Thousand/uL      RBC 5.85 Million/uL      Hemoglobin 17.7 g/dL      Hematocrit 49.3 %      MCV 84 fL      MCH 30.3 pg      MCHC 35.9 g/dL      RDW 13.2 %      MPV 11.1 fL      Platelets 197 Thousands/uL      nRBC 0 /100 WBCs      Segmented % 52 %      Immature Grans % 0 %      Lymphocytes % 37 %      Monocytes % 7 %      Eosinophils Relative 3 %      Basophils Relative 1 %      Absolute Neutrophils 4.44 Thousands/µL      Absolute Immature Grans 0.02 Thousand/uL      Absolute Lymphocytes 3.17 Thousands/µL      Absolute Monocytes 0.56 Thousand/µL      Eosinophils Absolute 0.22 Thousand/µL      Basophils Absolute 0.07 Thousands/µL             CTA abdomen pelvis w wo contrast   Final Interpretation by Kemal Stone MD (05/27 0532)      1.  No acute aortic syndrome   2.  Diverticulosis without diverticulitis         Workstation performed: [id number]             ECG 12 Lead Documentation Only    Date/Time: 5/27/2025 3:13 AM    Performed by: Bill Guadarrama MD  Authorized by: Bill Guadarrama MD    ECG reviewed by me, the ED Provider: yes    Patient location:  ED  Previous ECG:     Previous ECG:  Compared to current    Similarity:  Changes noted  Interpretation:     Interpretation: abnormal    Rate:     ECG rate:  80    ECG rate assessment: normal    Rhythm:     Rhythm: atrial fibrillation    Ectopy:     Ectopy: none    QRS:     QRS axis:  Left    QRS intervals:  Normal  Conduction:     Conduction: normal    ST segments:     ST segments:  Normal  T waves:     T waves: normal        ED Medication and Procedure Management   Prior to Admission Medications   Prescriptions Last Dose Informant Patient Reported? Taking?   clobetasol (TEMOVATE) 0.05 % cream   Yes No   clonazePAM  (KlonoPIN) 0.25 MG disintegrating tablet   No No   Sig: Take 1 tablet (0.25 mg total) by mouth daily as needed for seizures   meclizine (ANTIVERT) 25 mg tablet   No No   Sig: Take 1 tablet (25 mg total) by mouth 3 (three) times a day as needed for dizziness   Patient not taking: Reported on 1/25/2024      Facility-Administered Medications: None     Current Discharge Medication List        CONTINUE these medications which have NOT CHANGED    Details   clobetasol (TEMOVATE) 0.05 % cream       clonazePAM (KlonoPIN) 0.25 MG disintegrating tablet Take 1 tablet (0.25 mg total) by mouth daily as needed for seizures  Qty: 20 tablet, Refills: 0    Associated Diagnoses: Generalized anxiety disorder      meclizine (ANTIVERT) 25 mg tablet Take 1 tablet (25 mg total) by mouth 3 (three) times a day as needed for dizziness  Qty: 30 tablet, Refills: 0    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  ED SEPSIS DOCUMENTATION   Time reflects when diagnosis was documented in both MDM as applicable and the Disposition within this note       Time User Action Codes Description Comment    5/27/2025  7:03 AM Bill Guadarrama Add [I48.91] New onset atrial fibrillation (HCC)     5/27/2025  7:03 AM Bill Guadarrama Add [K92.1] Hematochezia     5/27/2025  7:03 AM Bill Guadarrama [R10.31] RLQ abdominal pain     5/27/2025 11:34 AM Everardo Cardoza Add [K92.2] GI bleed                      [1] No past medical history on file.  [2]   Past Surgical History:  Procedure Laterality Date    NO PAST SURGERIES      MA LAPAROSCOPIC APPENDECTOMY N/A 4/17/2017    Procedure: APPENDECTOMY LAPAROSCOPIC;  Surgeon: Miguel Monaco DO;  Location: [facility];  Service: General   [3]   Family History  Problem Relation Name Age of Onset    No Known Problems Mother      No Known Problems Father     [4]   Social History  Tobacco Use    Smoking status: Never    Smokeless tobacco: Never   Vaping Use    Vaping  status: Never Used   Substance Use Topics    Alcohol use: Yes     Comment: socially    Drug use: No        Bill Guadarrama MD  05/29/25 0709

## 2025-05-27 NOTE — CONSULTS
Consultation - Gastroenterology   Name: Guicho Sanabria 45 y.o. male I MRN: [id number]  Unit/Bed#: S -01 I Date of Admission: 5/27/2025   Date of Service: 5/27/2025 I Hospital Day: 0   Inpatient consult to gastroenterology  Consult performed by: Kendal Nobles PA-C  Consult ordered by: Everardo Cardoza DO        Physician Requesting Evaluation: Carolyn Rodriguez MD   Reason for Evaluation / Principal Problem: hematochezia      45-year-old male with a significant past medical history who presented to the emergency room 5/27 for abdominal pain and diarrhea with red blood in the stool.  CT unremarkable.  Found to be in A-fib started on heparin drip.  Assessment & Plan  Hematochezia  Patient reports acute onset diarrhea and right sided abdominal pain that started yesterday. Last episode 1230 contained some red blood.  CT unremarkable.  Hemoglobin normal.  No chronic symptoms. Suspect infectious etiology, appears less likely ischemic, inflammatory or lesion.    -Patient with new diagnosis of A fib on heparin drip, ECHO ordered. Would await complete cardiology workup prior to any endoscopic evaluation.  - Patient should have a colonoscopy to confirm etiology of underlying rectal bleeding. Timing TBD.    -If any further diarrhea, recommend stool infectious studies.  -Monitor stool output  -Monitor Hgb  -Continue clear liquid diet for now  -Currently on heparin drip.      I have discussed the above management plan in detail with the primary service.   Gastroenterology service will follow.    History of Present Illness   HPI:  Guicho Sanabria is a 45 y.o. male who presented to the emergency room early this morning 5/27 with no significant past medical history for lower abdominal pain and diarrhea with blood.  CMP unremarkable besides mild elevation of alkaline phosphatase at 105.  CBC unremarkable with hemoglobin of 17, platelets 197.  INR normal.  Lipase normal.  CT was performed which was also unremarkable.   Patient was found to have A-fib and started on heparin drip.  used.    Patient reports yesterday morning started having abdominal pain after eating breakfast. This was followed by a few episodes of nonbloody diarrhea. Later at night patient reports having another episode of diarrhea and had blood when wiping in the toilet water which prompted him to come to the emergency room.  He reports some nausea with this but no vomiting.  Pain is located in the right lower quadrant.  His last bowel movement was around 12:30 AM.  He reports 1 prior episode of blood in the stool however none recently.  No unintentional weight loss.  Prior to this his bowel movements were normal.    Abdominal surgeries include appendectomy.    No significant GI family history.    No prior colonoscopy.    Review of Systems   All other systems reviewed and are negative.    Medical History Review: I have reviewed the patient's PMH, PSH, Social History, Family History, Meds, and Allergies     Objective :  Temp:  [97.7 °F (36.5 °C)-98.1 °F (36.7 °C)] 98.1 °F (36.7 °C)  HR:  [79-92] 79  BP: (114-132)/(60-92) 127/92  Resp:  [16-18] 18  SpO2:  [96 %-97 %] 96 %  O2 Device: None (Room air)    Physical Exam  Vitals reviewed.   Constitutional:       General: He is not in acute distress.     Appearance: Normal appearance. He is not ill-appearing.   HENT:      Head: Normocephalic and atraumatic.     Eyes:      Conjunctiva/sclera: Conjunctivae normal.       Cardiovascular:      Rate and Rhythm: Normal rate and regular rhythm.      Heart sounds: No murmur heard.  Pulmonary:      Effort: Pulmonary effort is normal. No respiratory distress.      Breath sounds: Normal breath sounds.   Abdominal:      General: Abdomen is flat. There is no distension.      Palpations: Abdomen is soft.      Tenderness: There is abdominal tenderness (RLQ). There is no guarding or rebound.     Musculoskeletal:         General: No swelling.      Cervical back: Normal range of  motion.      Right lower leg: No edema.      Left lower leg: No edema.     Skin:     General: Skin is warm.      Coloration: Skin is not jaundiced.     Neurological:      General: No focal deficit present.      Mental Status: He is alert and oriented to person, place, and time. Mental status is at baseline.     Psychiatric:         Mood and Affect: Mood normal.         Behavior: Behavior normal.           Lab Results: I have reviewed the following results:

## 2025-05-28 ENCOUNTER — APPOINTMENT (OUTPATIENT)
Dept: NON INVASIVE DIAGNOSTICS | Facility: HOSPITAL | Age: 45
End: 2025-05-28
Payer: COMMERCIAL

## 2025-05-28 PROBLEM — R10.9 ABDOMINAL PAIN: Status: ACTIVE | Noted: 2025-05-27

## 2025-05-28 LAB
ALBUMIN SERPL BCG-MCNC: 4.5 G/DL (ref 3.5–5)
ALP SERPL-CCNC: 86 U/L (ref 34–104)
ALT SERPL W P-5'-P-CCNC: 17 U/L (ref 7–52)
ANION GAP SERPL CALCULATED.3IONS-SCNC: 3 MMOL/L (ref 4–13)
AORTIC ROOT: 3.7 CM
APTT PPP: 59 SECONDS (ref 23–34)
ASCENDING AORTA: 3.8 CM
AST SERPL W P-5'-P-CCNC: 11 U/L (ref 13–39)
BILIRUB SERPL-MCNC: 0.77 MG/DL (ref 0.2–1)
BSA FOR ECHO PROCEDURE: 2.27 M2
BUN SERPL-MCNC: 13 MG/DL (ref 5–25)
CALCIUM SERPL-MCNC: 9.2 MG/DL (ref 8.4–10.2)
CHLORIDE SERPL-SCNC: 105 MMOL/L (ref 96–108)
CO2 SERPL-SCNC: 26 MMOL/L (ref 21–32)
CREAT SERPL-MCNC: 0.77 MG/DL (ref 0.6–1.3)
DOP CALC LVOT AREA: 3.46 CM2
DOP CALC LVOT DIAMETER: 2.1 CM
E WAVE DECELERATION TIME: 245 MS
E/A RATIO: 1.16
ERYTHROCYTE [DISTWIDTH] IN BLOOD BY AUTOMATED COUNT: 13.5 % (ref 11.6–15.1)
FRACTIONAL SHORTENING: 30 (ref 28–44)
GFR SERPL CREATININE-BSD FRML MDRD: 110 ML/MIN/1.73SQ M
GLUCOSE SERPL-MCNC: 102 MG/DL (ref 65–140)
HCT VFR BLD AUTO: 47.6 % (ref 36.5–49.3)
HGB BLD-MCNC: 16.5 G/DL (ref 12–17)
INTERVENTRICULAR SEPTUM IN DIASTOLE (PARASTERNAL SHORT AXIS VIEW): 1.2 CM
INTERVENTRICULAR SEPTUM: 1.2 CM (ref 0.6–1.1)
LAAS-AP2: 23 CM2
LAAS-AP4: 16.7 CM2
LEFT ATRIUM SIZE: 4.3 CM
LEFT ATRIUM VOLUME (MOD BIPLANE): 56 ML
LEFT ATRIUM VOLUME INDEX (MOD BIPLANE): 24.7 ML/M2
LEFT INTERNAL DIMENSION IN SYSTOLE: 4 CM (ref 2.1–4)
LEFT VENTRICULAR INTERNAL DIMENSION IN DIASTOLE: 5.7 CM (ref 3.5–6)
LEFT VENTRICULAR POSTERIOR WALL IN END DIASTOLE: 1.2 CM
LEFT VENTRICULAR STROKE VOLUME: 89 ML
LV EF US.2D.A4C+ESTIMATED: 71 %
LVSV (TEICH): 89 ML
MAGNESIUM SERPL-MCNC: 2 MG/DL (ref 1.9–2.7)
MCH RBC QN AUTO: 29.4 PG (ref 26.8–34.3)
MCHC RBC AUTO-ENTMCNC: 34.7 G/DL (ref 31.4–37.4)
MCV RBC AUTO: 85 FL (ref 82–98)
MV E'TISSUE VEL-LAT: 10 CM/S
MV E'TISSUE VEL-SEP: 8 CM/S
MV PEAK A VEL: 0.58 M/S
MV PEAK E VEL: 67 CM/S
PLATELET # BLD AUTO: 175 THOUSANDS/UL (ref 149–390)
PMV BLD AUTO: 10.9 FL (ref 8.9–12.7)
POTASSIUM SERPL-SCNC: 3.7 MMOL/L (ref 3.5–5.3)
PROT SERPL-MCNC: 6.9 G/DL (ref 6.4–8.4)
RA PRESSURE ESTIMATED: 5 MMHG
RBC # BLD AUTO: 5.62 MILLION/UL (ref 3.88–5.62)
RIGHT ATRIUM AREA SYSTOLE A4C: 17.4 CM2
SINOTUBULAR JUNCTION: 3.1 CM
SL CV LEFT ATRIUM LENGTH A2C: 5.7 CM
SL CV LV EF: 65
SL CV PED ECHO LEFT VENTRICLE DIASTOLIC VOLUME (MOD BIPLANE) 2D: 159 ML
SL CV PED ECHO LEFT VENTRICLE SYSTOLIC VOLUME (MOD BIPLANE) 2D: 70 ML
SL CV SINUS OF VALSALVA 2D: 3.5 CM
SODIUM SERPL-SCNC: 134 MMOL/L (ref 135–147)
STJ: 3.1 CM
TRICUSPID ANNULAR PLANE SYSTOLIC EXCURSION: 3 CM
WBC # BLD AUTO: 6.69 THOUSAND/UL (ref 4.31–10.16)

## 2025-05-28 PROCEDURE — 93306 TTE W/DOPPLER COMPLETE: CPT

## 2025-05-28 PROCEDURE — 99214 OFFICE O/P EST MOD 30 MIN: CPT | Performed by: INTERNAL MEDICINE

## 2025-05-28 PROCEDURE — 85730 THROMBOPLASTIN TIME PARTIAL: CPT | Performed by: INTERNAL MEDICINE

## 2025-05-28 PROCEDURE — 83735 ASSAY OF MAGNESIUM: CPT

## 2025-05-28 PROCEDURE — 99232 SBSQ HOSP IP/OBS MODERATE 35: CPT | Performed by: INTERNAL MEDICINE

## 2025-05-28 PROCEDURE — 93306 TTE W/DOPPLER COMPLETE: CPT | Performed by: INTERNAL MEDICINE

## 2025-05-28 PROCEDURE — 80053 COMPREHEN METABOLIC PANEL: CPT

## 2025-05-28 PROCEDURE — 85027 COMPLETE CBC AUTOMATED: CPT

## 2025-05-28 RX ORDER — POTASSIUM CHLORIDE 1500 MG/1
40 TABLET, EXTENDED RELEASE ORAL ONCE
Status: COMPLETED | OUTPATIENT
Start: 2025-05-28 | End: 2025-05-28

## 2025-05-28 RX ORDER — METOPROLOL SUCCINATE 25 MG/1
25 TABLET, EXTENDED RELEASE ORAL DAILY
Status: DISCONTINUED | OUTPATIENT
Start: 2025-05-28 | End: 2025-05-29 | Stop reason: HOSPADM

## 2025-05-28 RX ORDER — ACETAMINOPHEN 325 MG/1
650 TABLET ORAL EVERY 8 HOURS PRN
Status: DISCONTINUED | OUTPATIENT
Start: 2025-05-28 | End: 2025-05-29 | Stop reason: HOSPADM

## 2025-05-28 RX ORDER — ACETAMINOPHEN 325 MG/1
975 TABLET ORAL EVERY 8 HOURS PRN
Status: DISCONTINUED | OUTPATIENT
Start: 2025-05-28 | End: 2025-05-28

## 2025-05-28 RX ADMIN — HEPARIN SODIUM 17.1 UNITS/KG/HR: 10000 INJECTION, SOLUTION INTRAVENOUS at 04:41

## 2025-05-28 RX ADMIN — HEPARIN SODIUM 2000 UNITS: 1000 INJECTION, SOLUTION INTRAVENOUS; SUBCUTANEOUS at 05:14

## 2025-05-28 RX ADMIN — POTASSIUM CHLORIDE 40 MEQ: 1500 TABLET, EXTENDED RELEASE ORAL at 08:10

## 2025-05-28 RX ADMIN — POLYETHYLENE GLYCOL 3350, SODIUM SULFATE ANHYDROUS, SODIUM BICARBONATE, SODIUM CHLORIDE, POTASSIUM CHLORIDE 4000 ML: 236; 22.74; 6.74; 5.86; 2.97 POWDER, FOR SOLUTION ORAL at 16:16

## 2025-05-28 RX ADMIN — METOPROLOL SUCCINATE 25 MG: 25 TABLET, EXTENDED RELEASE ORAL at 11:02

## 2025-05-28 RX ADMIN — ACETAMINOPHEN 975 MG: 325 TABLET ORAL at 05:34

## 2025-05-28 NOTE — DISCHARGE SUMMARY
Discharge Summary - Hospitalist   Name: Guicho Sanabria 45 y.o. male I MRN: [id number]  Unit/Bed#: S -01 I Date of Admission: 5/27/2025   Date of Service: 5/29/2025 I Hospital Day: 0     Assessment & Plan  Abdominal pain (Resolved: 5/29/2025)  Hematochezia (Resolved: 5/29/2025)  CC: RLQ pain, loose stools with trace blood x1 day  In the ED: hemodynamically stable, Hb stable  FOBT positive stool  CTAP revealed diverticulosis, no diverticulitis or any acute findings  Hemoglobin has remained stable  Abdominal pain has resolved, patient has not had any further episodes of hematochezia since admission  Recent Labs     05/27/25  1420 05/28/25  0432 05/29/25  0438   HGB 17.4* 16.5 17.5*     Plan:  Per Gastroenterology consult:  S/p colonoscopy - 1 sessile polyp removed with cold snare  Recommendation for repeat colonoscopy in 7 years  No evidence of acute GI bleed  Okay to resume regular diet  Continue to follow with PCP for standard CBC monitoring  A-fib (HCC)  Found to be in new onset A-fib in the ED   Reports experiencing palpitations intermittently over the past month which typically self resolve  Denies chest pain, shortness of breath   TSH within normal limits.  Patient denies any recent illnesses/sick contacts.  Patient's wife does report patient snores significantly.  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 Mildly dilated aortic root & ascending aorta  Since admission, patient has converted to normal sinus rhythm    Plan:  Per Cardiology:  Initiate Toprol-XL 25 mg daily  No indication for anticoagulation but okay to initiate ASA 81 mg daily  Recommend repeat BMP and magnesium outpatient  Outpatient sleep study; referral placed  Outpatient follow-up with cardiology on 6/13/2025     Medical Problems       Resolved Problems  Date Reviewed:  11/9/2020          Resolved    * (Principal) Abdominal pain 5/29/2025     Resolved by  Justin Huang DO    Hematochezia 5/29/2025     Resolved by  Justin Huang DO        Discharging Physician / Practitioner: Justin Huang DO  PCP: Patricia Hunter MD  Admission Date:   Admission Orders (From admission, onward)       Ordered        05/27/25 0727  Place in Observation  Once                          Discharge Date: 05/29/25    Next Steps for Physician/AP Assuming Care:  Schedule sleep study to evaluate for JOE    Test Results Pending at Discharge (will require follow up):  Colonoscopy tissue exam    Medication Changes for Discharge & Rationale:   Start taking Toprol-XL 25 mg daily  Start taking aspirin 81 mg daily  See after visit summary for reconciled discharge medications provided to patient and/or family.     Consultations During Hospital Stay:  Cardiology  Gastroenterology    Procedures Performed:   Colonoscopy 5/29/2025    Imaging findings / Test Results:   CTA abdomen/pelvis w/wo contrast 5/27/25: Impression  No acute aortic syndrome  Diverticulosis without diverticulitis  Echocardiogram 5/28/2025  Left Ventricle: Left ventricular cavity size is normal. Wall thickness is normal. The left ventricular ejection fraction is 65%. Systolic function is normal. Although no diagnostic regional wall motion abnormality was identified, this possibility cannot be completely excluded on the basis of this study. Diastolic function is normal.  Right Ventricle: Systolic function is grossly normal.  Aorta: The aortic root is mildly dilated. The ascending aorta is mildly dilated. The aortic root is 3.70 cm. The ascending aorta is 3.8 cm. Technically difficult study.    Incidental Findings:   N/A     Hospital Course:   Guicho Sanabria is a 45 y.o. male patient who originally presented to the hospital on 5/27/2025 due to abdominal pain and concerns for hematochezia.  In the ED, he underwent CT abdomen/pelvis which showed diverticulosis, no  acute findings.  He was hemodynamically stable, vital signs stable, hemoglobin stable.  He was also found to be in new onset atrial fibrillation.  TSH was within normal limits, largely unremarkable.  However, stool FOBT was positive.  He was initiated on heparin gtt and admitted for further management.  He was seen by cardiology for new onset atrial fibrillation, and per their recommendations he was able to be discontinued off his heparin gtt due to [id number] score of 0.  Cardiology recommended initiation of Toprol-XL 25 mg daily. Patient was also seen by inpatient gastroenterology, and per their recommendations as well as patient preference, he underwent inpatient colonoscopy.  Colonoscopy findings were largely unremarkable for any acute/source of GI bleed.  Per cardiology's recommendations, patient was initiated on aspirin following GI clearance. Additionally, his symptoms largely resolved.  He no longer had abdominal pain, hematochezia, and he spontaneously converted into normal sinus rhythm which was consistent throughout the rest of his hospitalization.  He denied any/all symptoms, including chest pain/shortness of breath/palpitations. As he was feeling well, the multidisciplinary team felt he could be safely discharged with outpatient follow-up as outlined above --including outpatient follow-up with cardiology, his PCP, and a sleep study referral.    Please see above list of diagnoses and related plan for additional information.     Discharge Day Visit / Exam:   Subjective:   Patient seen and evaluated bedside this morning.  Per report, no acute events overnight.  He remains hemodynamically stable.  He denies any abdominal pain, nausea/vomiting, fever/chills, and overall reports feeling well.  He also denies any chest pain, shortness of breath, palpitations.  He has been passing flatus.  Vitals: Blood Pressure: 129/83 (05/29/25 1319)  Pulse: 74 (05/29/25 1319)  Temperature: 98 °F (36.7 °C) (05/29/25  "1312)  Temp Source: Temporal (05/29/25 1232)  Respirations: 18 (05/29/25 1253)  Height: 5' 8\" (172.7 cm) (05/28/25 0850)  Weight - Scale: 116 kg (255 lb 11.7 oz) (05/28/25 0850)  SpO2: 95 % (05/29/25 1319)  Physical Exam  Vitals reviewed.   Constitutional:       General: He is not in acute distress.     Appearance: He is obese. He is not toxic-appearing.   HENT:      Right Ear: External ear normal.      Left Ear: External ear normal.     Eyes:      General: No scleral icterus.     Extraocular Movements: Extraocular movements intact.      Conjunctiva/sclera: Conjunctivae normal.       Cardiovascular:      Rate and Rhythm: Normal rate and regular rhythm.      Pulses: Normal pulses.   Pulmonary:      Effort: No respiratory distress.   Chest:      Chest wall: No tenderness.   Abdominal:      Tenderness: There is no abdominal tenderness. There is no right CVA tenderness, left CVA tenderness or guarding.     Musculoskeletal:         General: No swelling or tenderness.      Cervical back: Neck supple. No tenderness.     Skin:     Capillary Refill: Capillary refill takes less than 2 seconds.      Coloration: Skin is not jaundiced.     Neurological:      General: No focal deficit present.      Mental Status: He is alert and oriented to person, place, and time.     Psychiatric:         Mood and Affect: Mood normal.         Behavior: Behavior is cooperative.         Thought Content: Thought content normal.        Discussion with Family: Updated  (wife) at bedside.    Discharge instructions/Information to patient and family:   See after visit summary for information provided to patient and family.      Provisions for Follow-Up Care:  See after visit summary for information related to follow-up care and any pertinent home health orders.      Mobility at time of Discharge:   Basic Mobility Inpatient Raw Score: 24  JH-HLM Goal: 8: Walk 250 feet or more  JH-HLM Achieved: 8: Walk 250 feet ot more  HLM Goal achieved. " Continue to encourage appropriate mobility.     Disposition:   Home    Planned Readmission: No    Administrative Statements   Discharge Statement:  I have spent a total time of 45 minutes in caring for this patient on the day of the visit/encounter. >30 minutes of time was spent on: Diagnostic results, Prognosis, Risks and benefits of tx options, Instructions for management, Patient and family education, Importance of tx compliance, Risk factor reductions, Impressions, Counseling / Coordination of care, Documenting in the medical record, Reviewing / ordering tests, medicine, procedures  , and Communicating with other healthcare professionals .    **Please Note: This note may have been constructed using a voice recognition system**

## 2025-05-28 NOTE — PROGRESS NOTES
Progress Note - Hospitalist   Name: Guicho Sanabria 45 y.o. male I MRN: [id number]  Unit/Bed#: S -01 I Date of Admission: 5/27/2025   Date of Service: 5/28/2025 I Hospital Day: 0    Assessment & Plan  Abdominal pain  Hematochezia  CC: RLQ pain, loose stools with trace blood x1 day  In the ED: hemodynamically stable, Hb stable  FOBT positive stool  CTAP revealed diverticulosis, no diverticulitis or any acute findings  Of note, given patient had been in new onset atrial fibrillation, there had been concerns for mesenteric ischemia  Differential includes but is not limited to: diverticular versus hemorrhoids versus infectious process versus fissure versus mesenteric ischemia  Recent Labs     05/27/25  0230 05/27/25  1420 05/28/25  0432   HGB 17.7* 17.4* 16.5     Plan:  Per Gastroenterology consult:  Pt is agreeable to inpatient colonoscopy, will perform bowel prep  NPO at midnight  AM CBC  A-fib (HCC)  Found to be in new onset A-fib in the ED   Reports experiencing palpitations intermittently over the past month which typically self resolve  Denies chest pain, shortness of breath   TSH within normal limits.  Patient denies any recent illnesses/sick contacts.  Patient's wife does report patient snores significantly.  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 daily  Consider ASA 81 mg daily when cleared by GI  Monitor and replenish electrolytes as indicated  Goal: Mg > 2, K > 4  Echocardiogram 5/28/25: LVEF 65%, no diastolic or systolic dysfunction. Mildly dilated aortic root & ascending aorta  Outpatient sleep study    VTE Pharmacologic Prophylaxis: VTE Score: 2 Low Risk (Score 0-2) - Encourage Ambulation.    Mobility:   Basic Mobility Inpatient Raw Score: 24  JH-HLM Goal: 8: Walk 250 feet or more  JH-HLM Achieved: 8: Walk 250 feet ot  "more  JH-HLM Goal achieved. Continue to encourage appropriate mobility.    Patient Centered Rounds: I performed bedside rounds with nursing staff today.   Discussions with Specialists or Other Care Team Provider: GI, Cardiology    Education and Discussions with Family / Patient: Updated  (wife) at bedside.    Current Length of Stay: 0 day(s)  Current Patient Status: Observation   Certification Statement: The patient will continue to require additional inpatient hospital stay due to ongoing preparation for colonoscopy in evaluation of hematochezia.  Discharge Plan: Anticipate discharge tomorrow to home.    Code Status: Level 1 - Full Code    Subjective   Patient seen and evaluated bedside this morning.  Wife present at bedside, updates provided via [employer] services (#[id number]).  Per report, no acute events overnight.  Patient states he is feeling \"fine.\"  He denies any symptoms, including chest pain, shortness of breath, palpitations.  He denies any abdominal pain, nausea/vomiting, fever/chills.  He states his last bowel movement was yesterday which was nonbloody in nature.    Objective :  Temp:  [97.6 °F (36.4 °C)-98.2 °F (36.8 °C)] 97.7 °F (36.5 °C)  HR:  [66-80] 67  BP: (109-143)/(73-88) 116/74  Resp:  [18] 18  SpO2:  [95 %-97 %] 97 %    Body mass index is 38.88 kg/m².     Input and Output Summary (last 24 hours):   No intake or output data in the 24 hours ending 05/28/25 1255    Physical Exam  Vitals reviewed.   Constitutional:       General: He is not in acute distress.     Appearance: He is obese. He is not toxic-appearing.   HENT:      Right Ear: External ear normal.      Left Ear: External ear normal.     Eyes:      General: No scleral icterus.     Extraocular Movements: Extraocular movements intact.      Conjunctiva/sclera: Conjunctivae normal.       Cardiovascular:      Rate and Rhythm: Normal rate. Rhythm irregular.      Pulses: Normal pulses.   Pulmonary:      Effort: No " respiratory distress.   Chest:      Chest wall: No tenderness.   Abdominal:      Tenderness: There is no abdominal tenderness. There is no right CVA tenderness, left CVA tenderness or guarding.     Musculoskeletal:         General: No swelling or tenderness.      Cervical back: Neck supple. No tenderness.     Skin:     Capillary Refill: Capillary refill takes less than 2 seconds.      Coloration: Skin is not jaundiced.     Neurological:      General: No focal deficit present.      Mental Status: He is alert and oriented to person, place, and time.     Psychiatric:         Mood and Affect: Mood normal.         Behavior: Behavior is cooperative.         Thought Content: Thought content normal.           Lines/Drains:              Lab Results: I have reviewed the following results:   Results from last 7 days   Lab Units 05/28/25  0432 05/27/25  1420 05/27/25  0230   WBC Thousand/uL 6.69  --  8.48   HEMOGLOBIN g/dL 16.5   < > 17.7*   HEMATOCRIT % 47.6   < > 49.3   PLATELETS Thousands/uL 175  --  197   SEGS PCT %  --   --  52   LYMPHO PCT %  --   --  37   MONO PCT %  --   --  7   EOS PCT %  --   --  3    < > = values in this interval not displayed.     Results from last 7 days   Lab Units 05/28/25  0432   SODIUM mmol/L 134*   POTASSIUM mmol/L 3.7   CHLORIDE mmol/L 105   CO2 mmol/L 26   BUN mg/dL 13   CREATININE mg/dL 0.77   ANION GAP mmol/L 3*   CALCIUM mg/dL 9.2   ALBUMIN g/dL 4.5   TOTAL BILIRUBIN mg/dL 0.77   ALK PHOS U/L 86   ALT U/L 17   AST U/L 11*   GLUCOSE RANDOM mg/dL 102     Results from last 7 days   Lab Units 05/27/25  0809   INR  0.97             Results from last 7 days   Lab Units 05/27/25  0245   LACTIC ACID mmol/L 0.6       Recent Cultures (last 7 days):         Imaging Results Review: I reviewed radiology reports from this admission including: CT abdomen/pelvis and Echocardiogram.  Other Study Results Review: EKG was reviewed.     Last 24 Hours Medication List:     Current Facility-Administered  Medications:     acetaminophen (TYLENOL) tablet 975 mg, Q8H PRN    metoprolol succinate (TOPROL-XL) 24 hr tablet 25 mg, Daily    polyethylene glycol (GOLYTELY) bowel prep 4,000 mL, Once    Administrative Statements   Today, Patient Was Seen By: Justin Huang DO  PGY-I      **Please Note: This note may have been constructed using a voice recognition system.**

## 2025-05-28 NOTE — UTILIZATION REVIEW
NOTIFICATION OF OBSERVATION ADMISSION   AUTHORIZATION REQUEST   SERVICING FACILITY:   [place] [address]  Tax ID: [id number]  NPI: [id number]   ATTENDING PROVIDER:  Attending Name and NPI#: Carolyn Rodriguez Md [[id number]]  Address: [address] [place] [address]  Phone: [phone]   ADMISSION INFORMATION:  Place of Service: [facility]  Place of Service Code: 22 CPT Code:   Admitting Diagnosis Code/Description:  Hematochezia [K92.1]  Abdominal pain [R10.9]  RLQ abdominal pain [R10.31]  New onset atrial fibrillation (HCC) [I48.91]  Nausea vomiting and diarrhea [R11.2, R19.7]  Observation Admission Date/Time: 05/27/2025 0727  Discharge Date/Time: No discharge date for patient encounter.     UTILIZATION REVIEW CONTACT:  Candi Wells Utilization   Network Utilization Review Department  Phone: [phone]  Fax: [phone]  Email: [email or web address]  Contact for approvals/pending authorizations, clinical reviews, and discharge.     PHYSICIAN ADVISORY SERVICES:  Medical Necessity Denial & Rmti-ek-Vmwj Review  Phone: [phone]  Fax: [phone]  Email: [email or web address]     DISCHARGE SUPPORT TEAM:  For Patients Discharge Needs & Updates  Phone: [phone] opt. 2 Fax: [phone]  Email: [email or web address]

## 2025-05-28 NOTE — ASSESSMENT & PLAN NOTE
CC: RLQ pain, loose stools with trace blood x1 day  In the ED: hemodynamically stable, Hb stable  FOBT positive stool  CTAP revealed diverticulosis, no diverticulitis or any acute findings  Of note, given patient had been in new onset atrial fibrillation, there had been concerns for mesenteric ischemia  Differential includes but is not limited to: diverticular versus hemorrhoids versus infectious process versus fissure versus mesenteric ischemia  Recent Labs     05/27/25  0230 05/27/25  1420 05/28/25  0432   HGB 17.7* 17.4* 16.5     Plan:  Per Gastroenterology consult:  Pt is agreeable to inpatient colonoscopy, will perform bowel prep  NPO at midnight  AM CBC

## 2025-05-28 NOTE — PROGRESS NOTES
"Progress Note - Cardiology   Name: Guicho Sanabria 45 y.o. male I MRN: [id number]  Unit/Bed#: S [place]-01 I Date of Admission: 5/27/2025   Date of Service: 5/28/2025 I Hospital Day: 0     Assessment & Plan  GI bleed    Pt reported diarrhea and blood in stool for one day.   Hgb 17.7 on presentation  GI consult noted, recommending colonoscopy once cardiac workup completed, monitor H&H and stool output/diarrhea.  A-fib (HCC)  Incidentally found on EKG on presentation to the emergency department for blood in stool.  Does admit to some palpitations but short-lived over the past week. Asymptomatic now with controlled HRs resting in bed.   No prior known history of a A-fib  Rates are controlled not on any AV estuardo agents  Currently on IV heparin    Continue telemetry  Check echocardiogram  Add mag level and TSH to labs  Add low dose AV estuardo agent.   CHADS-Vasc score 0; will make recommendations for long term AC once workup completed.   Recommend OP sleep study. Decrease alcohol intake.     Assessment:  New onset atrial fibrillation - unclear etiology.  Possibly brought about by metabolic stress of GI bleed. However, GI considering mesenteric ischemia from cardioembolic source.  [id number] 0.  Now in NSR.    ? GI bleed - patient at acceptable cardiovascular risk for colonoscopy.     Plan:  Start low-dose b-blockers.  Aspirin 81mg daily when safe from GI standpoint.  Await possible colonoscopy (inpt vs outpt).  Stable for discharge from cardiac standpoint.         Subjective:   Converted to NSR.  Feels well.     Review of Systems   Cardiovascular:  Negative for chest pain, leg swelling and palpitations.   Respiratory:  Negative for shortness of breath.        Objective:   Vitals: Blood pressure 113/73, pulse 66, temperature 97.7 °F (36.5 °C), resp. rate 18, height 5' 8\" (1.727 m), weight 116 kg (255 lb 11.7 oz), SpO2 97%., Body mass index is 38.88 kg/m².,   Orthostatic Blood Pressures      Flowsheet Row Most Recent Value "   Blood Pressure 113/73 filed at 2025 0850   Patient Position - Orthostatic VS Lying filed at 2025 0400           Systolic (24hrs), Av , Min:109 , Max:143     Diastolic (24hrs), Av, Min:73, Max:88    No intake or output data in the 24 hours ending 25 1010  Weight (last 2 days)       Date/Time Weight    25 0850 116 (255.73)    25 0248 116 (255.07)                Telemetry Review: NSR    Physical Exam    Cardiovascular:      Rate and Rhythm: Normal rate and regular rhythm.      Heart sounds: Normal heart sounds. No murmur heard.     No friction rub. No gallop.   Pulmonary:      Breath sounds: Normal breath sounds. No wheezing or rales.           Laboratory Results:        CBC with diff:   Results from last 7 days   Lab Units 25  0432 25  1420 25  0230   WBC Thousand/uL 6.69  --  8.48   HEMOGLOBIN g/dL 16.5 17.4* 17.7*   HEMATOCRIT % 47.6 50.6* 49.3   MCV fL 85  --  84   PLATELETS Thousands/uL 175  --  197   RBC Million/uL 5.62  --  5.85*   MCH pg 29.4  --  30.3   MCHC g/dL 34.7  --  35.9   RDW % 13.5  --  13.2   MPV fL 10.9  --  11.1   NRBC AUTO /100 WBCs  --   --  0         CMP:  Results from last 7 days   Lab Units 25  0432 25  0230   POTASSIUM mmol/L 3.7 4.2   CHLORIDE mmol/L 105 105   CO2 mmol/L 26 21   BUN mg/dL 13 18   CREATININE mg/dL 0.77 <0.20*   CALCIUM mg/dL 9.2 8.7   AST U/L 11* 21   ALT U/L 17 18   ALK PHOS U/L 86 105*   EGFR ml/min/1.73sq m 110  --          BMP:  Results from last 7 days   Lab Units 25  0432 25  0230   POTASSIUM mmol/L 3.7 4.2   CHLORIDE mmol/L 105 105   CO2 mmol/L 26 21   BUN mg/dL 13 18   CREATININE mg/dL 0.77 <0.20*   CALCIUM mg/dL 9.2 8.7       BNP:     Magnesium:   Results from last 7 days   Lab Units 25  0432 25  0230   MAGNESIUM mg/dL 2.0 2.6       Coags:   Results from last 7 days   Lab Units 25  0432 25  2135 25  1420 25  0809   PTT seconds 59* 47* 32 23   INR    --   --   --  0.97         Meds/Allergies   Current Facility-Administered Medications   Medication Dose Route Frequency Provider Last Rate    acetaminophen  975 mg Oral Q8H PRN Vincent Llamas,       heparin (porcine)  3-20 Units/kg/hr (Order-Specific) Intravenous Titrated Everardo Nani, DO 19.1 Units/kg/hr (05/28/25 0515)    heparin (porcine)  2,000 Units Intravenous Q6H PRN Everardo Nani, DO      heparin (porcine)  4,000 Units Intravenous Q6H PRN Everardo Nani, DO       heparin (porcine), 3-20 Units/kg/hr (Order-Specific), Last Rate: 19.1 Units/kg/hr (05/28/25 0515)        Medications Prior to Admission:     clobetasol (TEMOVATE) 0.05 % cream    clonazePAM (KlonoPIN) 0.25 MG disintegrating tablet    meclizine (ANTIVERT) 25 mg tablet    Assessment:  Principal Problem:    GI bleed  Active Problems:    A-fib (HCC)    Hematochezia

## 2025-05-28 NOTE — UTILIZATION REVIEW
Initial Clinical Review    Admission: Date/Time/Statement:   Admission Orders (From admission, onward)       Ordered        05/27/25 0727  Place in Observation  Once                          Orders Placed This Encounter   Procedures    Place in Observation     Standing Status:   Standing     Number of Occurrences:   1     Level of Care:   Med Surg [16]     ED Arrival Information       Expected   -    Arrival   5/27/2025 01:42    Acuity   Urgent              Means of arrival   Walk-In    Escorted by   Family Member    Service   Hospitalist    Admission type   Emergency              Arrival complaint   n/v/d, abd pain, bloody stool             Chief Complaint   Patient presents with    Abdominal Pain     Lower abdominal pain starting yesterday accompanied by 5 bowel movements. Today noticed dark red blood in the last few bowel movements. +nausea, denies vomiting and fevers.        Initial Presentation: 45 y.o. male no significant past medical history presents as walk in  with 1 day of blood in stool. The patient reports approximately 2 weeks ago he had symptoms of palpitation with lightheadedness, resolved on its own.   Last night, he began to have multiple episodes loose bowel movements w/  1 episode of blood in stool. He has not had any further episodes of blood in stool. Reports right lower quadrant abdominal pain, no vomiting or nausea.   EXAM   requesting to eat, denies any chest pain or shortness of breath, no palpitations. Positive FOBT, labs HGB 17.7  EKG reveals new onset AFib. Given IV Toradol, IV Heparin GTT  Observation admission due to GI bleed/ hematochezia, New onset AFib. Recs per GI & Cardiology consults. CL liq until GI eval, trend HGB. Tele, IV Heparin GTT, ECHO  GI  recommend to get colonoscopy eventually given rectal bleeding. This can be done inpatient versus outpatient based on his clinical status. Continue clear diet for now. Currently on heparin drip. Can hold off on stool studies but if  diarrhea persist can order stool studies.   Cardiology  Continue IV heparin gtt for now.  Proceed with colonoscopy.  If no contra-indication, would consider KENDRA/cardioversion once colonoscopy is completed  Anticipated Length of Stay/Certification Statement: Patient will be admitted on an observation basis with an anticipated length of stay of less than 2 midnights secondary to GI bleed and new onset afib.  Date: 5/28/2025   Day 2:   Cardiology  Cont tele, obtain ECHO, MAG, TSH ; OP sleep study   Start low-dose b-blockers.  Aspirin 81mg daily when safe from GI standpoint.  Await possible colonoscopy (inpt vs outpt).  Stable for discharge  GI  Sympts improved, no more blood or diarrhea, pain improved, jonathan cl liq. No previous colonoscopy  Prefers IP colonoscopy; cont cl liq w bowel prep in PM, NPO @ [facility]  ED Treatment-Medication Administration from 05/27/2025 0137 to 05/27/2025 0959         Date/Time Order Dose Route Action     05/27/2025 0221 acetaminophen (TYLENOL) tablet 975 mg 975 mg Oral Given     05/27/2025 0230 ketorolac (TORADOL) injection 15 mg 15 mg Intravenous Given     05/27/2025 0443 iohexol (OMNIPAQUE) 350 MG/ML injection (MULTI-DOSE) 100 mL 100 mL Intravenous Given     05/27/2025 0902 heparin (porcine) injection 4,000 Units 4,000 Units Intravenous Given     05/27/2025 0903 heparin (porcine) 25,000 units in 0.45% NaCl 250 mL infusion (premix) 11.1 Units/kg/hr Intravenous New Bag            Scheduled Medications:     Continuous IV Infusions:  heparin (porcine), 3-20 Units/kg/hr (Order-Specific), Intravenous, Titrated      PRN Meds:  acetaminophen, 975 mg, Oral, Q8H PRN  heparin (porcine), 2,000 Units, Intravenous, Q6H PRN  heparin (porcine), 4,000 Units, Intravenous, Q6H PRN      ED Triage Vitals   Temperature Pulse Respirations Blood Pressure SpO2 Pain Score   05/27/25 0151 05/27/25 0151 05/27/25 0151 05/27/25 0151 05/27/25 0151 05/27/25 0221   97.7 °F (36.5 °C) 90 18 121/87 96 % 5     Weight (last 2 days)        Date/Time Weight    05/28/25 0850 116 (255.73)    05/27/25 0248 116 (255.07)            Vital Signs (last 3 days)       Date/Time Temp Pulse Resp BP MAP (mmHg) SpO2 O2 Device Patient Position - Orthostatic VS Pain    05/28/25 0850 -- 66 -- 113/73 -- -- -- -- --    05/28/25 07:43:35 97.7 °F (36.5 °C) 66 18 113/73 86 97 % -- -- --    05/28/25 0534 -- -- -- -- -- -- -- -- 4    05/28/25 02:18:36 97.9 °F (36.6 °C) 68 -- 125/79 94 97 % -- -- --    05/27/25 22:12:24 97.6 °F (36.4 °C) 80 -- 109/74 86 96 % -- -- --    05/27/25 19:09:15 98.2 °F (36.8 °C) 67 -- 143/87 106 96 % -- -- No Pain    05/27/25 15:28:14 98.2 °F (36.8 °C) 79 18 122/88 99 95 % -- -- --    05/27/25 10:05:28 98.1 °F (36.7 °C) 79 -- 127/92 104 96 % -- -- --    05/27/25 1000 -- -- -- -- -- -- -- -- No Pain    05/27/25 0948 97.9 °F (36.6 °C) 92 18 130/84 97 97 % None (Room air) -- No Pain    05/27/25 0908 -- 84 16 132/88 -- -- None (Room air) -- --    05/27/25 0630 -- 86 -- 124/66 89 97 % -- -- --    05/27/25 0600 -- 82 -- 114/84 92 96 % -- -- --    05/27/25 0507 -- -- -- -- -- -- -- -- 2    05/27/25 0400 -- 85 18 119/70 89 96 % None (Room air) Lying --    05/27/25 0330 -- 90 18 117/60 83 96 % None (Room air) Lying --    05/27/25 0221 -- -- -- -- -- -- -- -- 5    05/27/25 0151 97.7 °F (36.5 °C) 90 18 121/87 99 96 % None (Room air) Lying --              Pertinent Labs/Diagnostic Test Results:   Radiology:  CTA abdomen pelvis w wo contrast   Final Interpretation by Kemal Stone MD (05/27 4278)      1.  No acute aortic syndrome   2.  Diverticulosis without diverticulitis         Workstation performed: [id number]           Cardiology:  Echo complete w/ contrast if indicated   Final Result by Chilango Ballesteros MD (05/28 1947)        Left Ventricle: Left ventricular cavity size is normal. Wall thickness    is normal. The left ventricular ejection fraction is 65%. Systolic    function is normal. Although no diagnostic regional wall motion    abnormality was  "identified, this possibility cannot be completely excluded    on the basis of this study. Diastolic function is normal.     Right Ventricle: Systolic function is grossly normal.     Aorta: The aortic root is mildly dilated. The ascending aorta is mildly    dilated. The aortic root is 3.70 cm. The ascending aorta is 3.8 cm.     Technically difficult study.         ECG 12 lead    by Interface, Ris Results In (05/27 0233)        GI:  No orders to display           Results from last 7 days   Lab Units 05/28/25 0432 05/27/25 1420 05/27/25  0230   WBC Thousand/uL 6.69  --  8.48   HEMOGLOBIN g/dL 16.5 17.4* 17.7*   HEMATOCRIT % 47.6 50.6* 49.3   PLATELETS Thousands/uL 175  --  197   TOTAL NEUT ABS Thousands/µL  --   --  4.44         Results from last 7 days   Lab Units 05/28/25 0432 05/27/25  0230   SODIUM mmol/L 134* 138   POTASSIUM mmol/L 3.7 4.2   CHLORIDE mmol/L 105 105   CO2 mmol/L 26 21   ANION GAP mmol/L 3* 12   BUN mg/dL 13 18   CREATININE mg/dL 0.77 <0.20*   EGFR ml/min/1.73sq m 110  --    CALCIUM mg/dL 9.2 8.7   MAGNESIUM mg/dL 2.0 2.6     Results from last 7 days   Lab Units 05/28/25 0432 05/27/25  0230   AST U/L 11* 21   ALT U/L 17 18   ALK PHOS U/L 86 105*   TOTAL PROTEIN g/dL 6.9 6.9   ALBUMIN g/dL 4.5 4.8   TOTAL BILIRUBIN mg/dL 0.77 0.51         Results from last 7 days   Lab Units 05/28/25 0432 05/27/25  0230   GLUCOSE RANDOM mg/dL 102 96             No results found for: \"BETA-HYDROXYBUTYRATE\"                   Results from last 7 days   Lab Units 05/27/25  0230   HS TNI 0HR ng/L 3         Results from last 7 days   Lab Units 05/28/25 0432 05/27/25  2135 05/27/25  1420 05/27/25  0809   PROTIME seconds  --   --   --  13.6   INR   --   --   --  0.97   PTT seconds 59* 47* 32 23     Results from last 7 days   Lab Units 05/27/25  0230   TSH 3RD GENERATON uIU/mL 3.528         Results from last 7 days   Lab Units 05/27/25  0245   LACTIC ACID mmol/L 0.6                                 Results from last 7 " days   Lab Units 05/27/25  0230   LIPASE u/L 45                 Results from last 7 days   Lab Units 05/27/25  0226   CLARITY UA  Clear   COLOR UA  Light Yellow   SPEC GRAV UA  1.025   PH UA  6.0   GLUCOSE UA mg/dl Negative   KETONES UA mg/dl Negative   BLOOD UA  Small*   PROTEIN UA mg/dl Negative   NITRITE UA  Negative   BILIRUBIN UA  Negative   UROBILINOGEN UA (BE) mg/dl <2.0   LEUKOCYTES UA  Negative   WBC UA /hpf None Seen   RBC UA /hpf 1-2   BACTERIA UA /hpf None Seen   EPITHELIAL CELLS WET PREP /hpf None Seen                                                   Past Medical History[1]  Present on Admission:  **None**      Admitting Diagnosis: Hematochezia [K92.1]  Abdominal pain [R10.9]  RLQ abdominal pain [R10.31]  New onset atrial fibrillation (HCC) [I48.91]  Nausea vomiting and diarrhea [R11.2, R19.7]  Age/Sex: 45 y.o. male    Network Utilization Review Department  ATTENTION: Please call with any questions or concerns to [phone] and carefully listen to the prompts so that you are directed to the right person. All voicemails are confidential.   For Discharge needs, contact Care Management DC Support Team at [phone] opt. 2  Send all requests for admission clinical reviews, approved or denied determinations and any other requests to dedicated fax number below belonging to the campus where the patient is receiving treatment. List of dedicated fax numbers for the Facilities:  FACILITY NAME UR FAX NUMBER   ADMISSION DENIALS (Administrative/Medical Necessity) [phone]   DISCHARGE SUPPORT TEAM (NETWORK) [phone]   PARENT CHILD HEALTH (Maternity/NICU/Pediatrics) [phone]   [facility] [phone]   [facility] [phone]   [facility] [phone]   [facility] [phone]   [facility] [phone]   [facility] [phone]   [facility]  [facility] [phone]   [facility] [phone]   [facility] [phone]   [facility] [phone]   [facility] [phone]   [facility] [phone]              [1] No past medical history on file.

## 2025-05-28 NOTE — ASSESSMENT & PLAN NOTE
CC: RLQ pain, loose stools with trace blood x1 day  In the ED: hemodynamically stable, Hb stable  FOBT positive stool  CTAP revealed diverticulosis, no diverticulitis or any acute findings  Hemoglobin has remained stable  Abdominal pain has resolved, patient has not had any further episodes of hematochezia since admission  Recent Labs     05/27/25  1420 05/28/25  0432 05/29/25  0438   HGB 17.4* 16.5 17.5*     Plan:  Per Gastroenterology consult:  S/p colonoscopy - 1 sessile polyp removed with cold snare  Recommendation for repeat colonoscopy in 7 years  No evidence of acute GI bleed  Okay to resume regular diet  Continue to follow with PCP for standard CBC monitoring

## 2025-05-28 NOTE — PROGRESS NOTES
Progress Note - Gastroenterology   Name: Guicho Sanabria 45 y.o. male I MRN: [id number]  Unit/Bed#: S MS Ken-01 I Date of Admission: 5/27/2025   Date of Service: 5/28/2025 I Hospital Day: 0      45-year-old male with a significant past medical history who presented to the emergency room 5/27 for abdominal pain and diarrhea with red blood in the stool. CT unremarkable. Found to be in A-fib started on heparin drip.   Assessment & Plan  Hematochezia  Patient doing well.  Hemoglobin stable at 16.  Bowel movement yesterday was without blood.  Abdominal pain improved. No prior colonoscopy.  Possibly hemorrhoidal versus infectious, rule out mass or lesion.    - We discussed recommendations for colonoscopy.  We discussed inpatient versus outpatient.  Patient would like to pursue these as an inpatient.  - Per cardiology note, patient is at acceptable cardiovascular risk  -Continue clear liquid diet today.  -Golytely/dulcolax bowel prep  - Plan for colonoscopy tomorrow, 5/29  - Okay to continue heparin drip for now.  - We will plan to hold heparin drip tomorrow at 0800.    -Discussed with patient's daughter, friend and wife at bedside.      I have discussed the above management plan in detail with the primary service.   Gastroenterology service will follow.    Subjective   Patient reports he is doing well. He had BM yesterday without blood. Pain improved. Tolerating diet. Interpretor used. Patients wife, friend and daughter present.    Objective :  Temp:  [97.6 °F (36.4 °C)-98.2 °F (36.8 °C)] 97.7 °F (36.5 °C)  HR:  [66-80] 66  BP: (109-143)/(73-88) 113/73  Resp:  [18] 18  SpO2:  [95 %-97 %] 97 %    Physical Exam  Vitals reviewed.   Constitutional:       General: He is not in acute distress.     Appearance: Normal appearance. He is not ill-appearing.   HENT:      Head: Normocephalic and atraumatic.     Eyes:      Conjunctiva/sclera: Conjunctivae normal.       Cardiovascular:      Rate and Rhythm: Normal rate and regular  rhythm.      Heart sounds: No murmur heard.  Pulmonary:      Effort: Pulmonary effort is normal. No respiratory distress.      Breath sounds: Normal breath sounds.   Abdominal:      General: Abdomen is flat. There is no distension.      Palpations: Abdomen is soft.      Tenderness: There is no abdominal tenderness. There is no guarding or rebound.     Musculoskeletal:         General: No swelling.      Cervical back: Normal range of motion.      Right lower leg: No edema.      Left lower leg: No edema.     Skin:     General: Skin is warm.      Coloration: Skin is not jaundiced.     Neurological:      General: No focal deficit present.      Mental Status: He is alert and oriented to person, place, and time. Mental status is at baseline.     Psychiatric:         Mood and Affect: Mood normal.         Behavior: Behavior normal.           Lab Results: I have reviewed the following results:

## 2025-05-28 NOTE — ASSESSMENT & PLAN NOTE
Patient doing well.  Hemoglobin stable at 16.  Bowel movement yesterday was without blood.  Abdominal pain improved. No prior colonoscopy.  Possibly hemorrhoidal versus infectious, rule out mass or lesion.    - We discussed recommendations for colonoscopy.  We discussed inpatient versus outpatient.  Patient would like to pursue these as an inpatient.  - Per cardiology note, patient is at acceptable cardiovascular risk  -Continue clear liquid diet today.  -Golytely/dulcolax bowel prep  - Plan for colonoscopy tomorrow, 5/29  - Okay to continue heparin drip for now.  - We will plan to hold heparin drip tomorrow at 0800.    -Discussed with patient's daughter, friend and wife at bedside.

## 2025-05-28 NOTE — ASSESSMENT & PLAN NOTE
Found to be in new onset A-fib in the ED   Reports experiencing palpitations intermittently over the past month which typically self resolve  Denies chest pain, shortness of breath   TSH within normal limits.  Patient denies any recent illnesses/sick contacts.  Patient's wife does report patient snores significantly.  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 Mildly dilated aortic root & ascending aorta  Since admission, patient has converted to normal sinus rhythm    Plan:  Per Cardiology:  Initiate Toprol-XL 25 mg daily  No indication for anticoagulation but okay to initiate ASA 81 mg daily  Recommend repeat BMP and magnesium outpatient  Outpatient sleep study; referral placed  Outpatient follow-up with cardiology on 6/13/2025

## 2025-05-28 NOTE — ASSESSMENT & PLAN NOTE
Found to be in new onset A-fib in the ED   Reports experiencing palpitations intermittently over the past month which typically self resolve  Denies chest pain, shortness of breath   TSH within normal limits.  Patient denies any recent illnesses/sick contacts.  Patient's wife does report patient snores significantly.  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 daily  Consider ASA 81 mg daily when cleared by GI  Monitor and replenish electrolytes as indicated  Goal: Mg > 2, K > 4  Echocardiogram 5/28/25: LVEF 65%, no diastolic or systolic dysfunction. Mildly dilated aortic root & ascending aorta  Outpatient sleep study

## 2025-05-29 ENCOUNTER — ANESTHESIA (OUTPATIENT)
Dept: GASTROENTEROLOGY | Facility: HOSPITAL | Age: 45
End: 2025-05-29
Payer: COMMERCIAL

## 2025-05-29 ENCOUNTER — ANESTHESIA EVENT (OUTPATIENT)
Dept: GASTROENTEROLOGY | Facility: HOSPITAL | Age: 45
End: 2025-05-29
Payer: COMMERCIAL

## 2025-05-29 ENCOUNTER — APPOINTMENT (OUTPATIENT)
Dept: GASTROENTEROLOGY | Facility: HOSPITAL | Age: 45
End: 2025-05-29
Attending: PHYSICIAN ASSISTANT
Payer: COMMERCIAL

## 2025-05-29 VITALS
HEIGHT: 68 IN | HEART RATE: 69 BPM | WEIGHT: 255.73 LBS | SYSTOLIC BLOOD PRESSURE: 126 MMHG | OXYGEN SATURATION: 94 % | RESPIRATION RATE: 18 BRPM | TEMPERATURE: 98 F | BODY MASS INDEX: 38.76 KG/M2 | DIASTOLIC BLOOD PRESSURE: 75 MMHG

## 2025-05-29 PROBLEM — R10.9 ABDOMINAL PAIN: Status: RESOLVED | Noted: 2025-05-27 | Resolved: 2025-05-29

## 2025-05-29 PROBLEM — K92.1 HEMATOCHEZIA: Status: RESOLVED | Noted: 2025-05-27 | Resolved: 2025-05-29

## 2025-05-29 LAB
ANION GAP SERPL CALCULATED.3IONS-SCNC: 6 MMOL/L (ref 4–13)
BUN SERPL-MCNC: 12 MG/DL (ref 5–25)
CALCIUM SERPL-MCNC: 9.6 MG/DL (ref 8.4–10.2)
CHLORIDE SERPL-SCNC: 104 MMOL/L (ref 96–108)
CO2 SERPL-SCNC: 30 MMOL/L (ref 21–32)
CREAT SERPL-MCNC: 0.86 MG/DL (ref 0.6–1.3)
ERYTHROCYTE [DISTWIDTH] IN BLOOD BY AUTOMATED COUNT: 13.6 % (ref 11.6–15.1)
GFR SERPL CREATININE-BSD FRML MDRD: 105 ML/MIN/1.73SQ M
GLUCOSE P FAST SERPL-MCNC: 90 MG/DL (ref 65–99)
GLUCOSE SERPL-MCNC: 90 MG/DL (ref 65–140)
HCT VFR BLD AUTO: 52.6 % (ref 36.5–49.3)
HGB BLD-MCNC: 17.5 G/DL (ref 12–17)
MAGNESIUM SERPL-MCNC: 2.1 MG/DL (ref 1.9–2.7)
MCH RBC QN AUTO: 29 PG (ref 26.8–34.3)
MCHC RBC AUTO-ENTMCNC: 33.3 G/DL (ref 31.4–37.4)
MCV RBC AUTO: 87 FL (ref 82–98)
PLATELET # BLD AUTO: 190 THOUSANDS/UL (ref 149–390)
PMV BLD AUTO: 10.7 FL (ref 8.9–12.7)
POTASSIUM SERPL-SCNC: 3.9 MMOL/L (ref 3.5–5.3)
RBC # BLD AUTO: 6.04 MILLION/UL (ref 3.88–5.62)
SODIUM SERPL-SCNC: 140 MMOL/L (ref 135–147)
WBC # BLD AUTO: 7.27 THOUSAND/UL (ref 4.31–10.16)

## 2025-05-29 PROCEDURE — 45385 COLONOSCOPY W/LESION REMOVAL: CPT | Performed by: STUDENT IN AN ORGANIZED HEALTH CARE EDUCATION/TRAINING PROGRAM

## 2025-05-29 PROCEDURE — 99239 HOSP IP/OBS DSCHRG MGMT >30: CPT | Performed by: INTERNAL MEDICINE

## 2025-05-29 PROCEDURE — 99214 OFFICE O/P EST MOD 30 MIN: CPT | Performed by: INTERNAL MEDICINE

## 2025-05-29 PROCEDURE — 88305 TISSUE EXAM BY PATHOLOGIST: CPT | Performed by: PATHOLOGY

## 2025-05-29 PROCEDURE — 80048 BASIC METABOLIC PNL TOTAL CA: CPT

## 2025-05-29 PROCEDURE — 85027 COMPLETE CBC AUTOMATED: CPT

## 2025-05-29 PROCEDURE — 83735 ASSAY OF MAGNESIUM: CPT

## 2025-05-29 RX ORDER — LIDOCAINE HYDROCHLORIDE 10 MG/ML
INJECTION, SOLUTION EPIDURAL; INFILTRATION; INTRACAUDAL; PERINEURAL AS NEEDED
Status: DISCONTINUED | OUTPATIENT
Start: 2025-05-29 | End: 2025-05-29

## 2025-05-29 RX ORDER — PROPOFOL 10 MG/ML
INJECTION, EMULSION INTRAVENOUS AS NEEDED
Status: DISCONTINUED | OUTPATIENT
Start: 2025-05-29 | End: 2025-05-29

## 2025-05-29 RX ORDER — POTASSIUM CHLORIDE 1500 MG/1
20 TABLET, EXTENDED RELEASE ORAL ONCE
Status: COMPLETED | OUTPATIENT
Start: 2025-05-29 | End: 2025-05-29

## 2025-05-29 RX ORDER — PROPOFOL 10 MG/ML
INJECTION, EMULSION INTRAVENOUS CONTINUOUS PRN
Status: DISCONTINUED | OUTPATIENT
Start: 2025-05-29 | End: 2025-05-29

## 2025-05-29 RX ORDER — ASPIRIN 81 MG/1
81 TABLET, CHEWABLE ORAL DAILY
Qty: 30 TABLET | Refills: 0 | Status: SHIPPED | OUTPATIENT
Start: 2025-05-29 | End: 2025-06-13

## 2025-05-29 RX ORDER — SODIUM CHLORIDE, SODIUM LACTATE, POTASSIUM CHLORIDE, CALCIUM CHLORIDE 600; 310; 30; 20 MG/100ML; MG/100ML; MG/100ML; MG/100ML
INJECTION, SOLUTION INTRAVENOUS CONTINUOUS PRN
Status: DISCONTINUED | OUTPATIENT
Start: 2025-05-29 | End: 2025-05-29

## 2025-05-29 RX ORDER — METOPROLOL SUCCINATE 25 MG/1
25 TABLET, EXTENDED RELEASE ORAL DAILY
Qty: 30 TABLET | Refills: 0 | Status: SHIPPED | OUTPATIENT
Start: 2025-05-30 | End: 2025-06-13

## 2025-05-29 RX ADMIN — LIDOCAINE HYDROCHLORIDE 50 MG: 10 INJECTION, SOLUTION EPIDURAL; INFILTRATION; INTRACAUDAL at 12:07

## 2025-05-29 RX ADMIN — PROPOFOL 50 MG: 10 INJECTION, EMULSION INTRAVENOUS at 12:12

## 2025-05-29 RX ADMIN — PROPOFOL 100 MCG/KG/MIN: 10 INJECTION, EMULSION INTRAVENOUS at 12:07

## 2025-05-29 RX ADMIN — POTASSIUM CHLORIDE 20 MEQ: 1500 TABLET, EXTENDED RELEASE ORAL at 09:15

## 2025-05-29 RX ADMIN — PROPOFOL 50 MG: 10 INJECTION, EMULSION INTRAVENOUS at 12:09

## 2025-05-29 RX ADMIN — Medication 40 MG: at 12:12

## 2025-05-29 RX ADMIN — METOPROLOL SUCCINATE 25 MG: 25 TABLET, EXTENDED RELEASE ORAL at 09:15

## 2025-05-29 RX ADMIN — PROPOFOL 150 MG: 10 INJECTION, EMULSION INTRAVENOUS at 12:07

## 2025-05-29 RX ADMIN — SODIUM CHLORIDE, SODIUM LACTATE, POTASSIUM CHLORIDE, AND CALCIUM CHLORIDE: .6; .31; .03; .02 INJECTION, SOLUTION INTRAVENOUS at 12:00

## 2025-05-29 NOTE — PROGRESS NOTES
"Progress Note - Cardiology   Name: Guicho Sanabria 45 y.o. male I MRN: [id number]  Unit/Bed#: S MS Ken-01 I Date of Admission: 5/27/2025   Date of Service: 5/29/2025 I Hospital Day: 0     Assessment & Plan  Abdominal pain    Pt reported diarrhea and blood in stool for one day.   Hgb 17.7 on presentation  GI consult noted, recommending colonoscopy once cardiac workup completed, monitor H&H and stool output/diarrhea.  A-fib (HCC)  Incidentally found on EKG on presentation to the emergency department for blood in stool.  Does admit to some palpitations but short-lived over the past week. Asymptomatic now with controlled HRs resting in bed.   No prior known history of a A-fib  Rates are controlled not on any AV estuardo agents  Currently on IV heparin    Continue telemetry  Check echocardiogram  Add mag level and TSH to labs  Add low dose AV estuardo agent.   CHADS-Vasc score 0; will make recommendations for long term AC once workup completed.   Recommend OP sleep study. Decrease alcohol intake.     Assessment:  New onset atrial fibrillation - unclear etiology.  Possibly brought about by metabolic stress of GI bleed. However, GI considering mesenteric ischemia from cardioembolic source.  [id number] 0.  Now in NSR.    ? GI bleed - patient at acceptable cardiovascular risk for colonoscopy.     Plan:  Continue b-blockers.   Aspirin 81mg daily when safe from GI standpoint.  For colonoscopy today.   Restart telemetry.        Subjective:   No complaints.    Review of Systems   Cardiovascular:  Negative for chest pain, leg swelling and palpitations.   Respiratory:  Negative for shortness of breath.        Objective:   Vitals: Blood pressure 114/82, pulse 75, temperature 97.6 °F (36.4 °C), resp. rate 17, height 5' 8\" (1.727 m), weight 116 kg (255 lb 11.7 oz), SpO2 96%., Body mass index is 38.88 kg/m².,   Orthostatic Blood Pressures      Flowsheet Row Most Recent Value   Blood Pressure 114/82 filed at 05/29/2025 0916   Patient " Position - Orthostatic VS Lying filed at 2025 0400           Systolic (24hrs), Av , Min:114 , Max:132     Diastolic (24hrs), Av, Min:74, Max:85    No intake or output data in the 24 hours ending 25 0935  Weight (last 2 days)       Date/Time Weight    25 0850 116 (255.73)    25 0248 116 (255.07)                Telemetry Review: Off    Physical Exam    Cardiovascular:      Rate and Rhythm: Normal rate and regular rhythm.      Heart sounds: Normal heart sounds. No murmur heard.     No friction rub. No gallop.   Pulmonary:      Breath sounds: Normal breath sounds. No wheezing or rales.           Laboratory Results:        CBC with diff:   Results from last 7 days   Lab Units 25  1420 25  0230   WBC Thousand/uL 7.27 6.69  --  8.48   HEMOGLOBIN g/dL 17.5* 16.5 17.4* 17.7*   HEMATOCRIT % 52.6* 47.6 50.6* 49.3   MCV fL 87 85  --  84   PLATELETS Thousands/uL 190 175  --  197   RBC Million/uL 6.04* 5.62  --  5.85*   MCH pg 29.0 29.4  --  30.3   MCHC g/dL 33.3 34.7  --  35.9   RDW % 13.6 13.5  --  13.2   MPV fL 10.7 10.9  --  11.1   NRBC AUTO /100 WBCs  --   --   --  0         CMP:  Results from last 7 days   Lab Units 25  04325  0230   POTASSIUM mmol/L 3.9 3.7 4.2   CHLORIDE mmol/L 104 105 105   CO2 mmol/L 30 26 21   BUN mg/dL 12 13 18   CREATININE mg/dL 0.86 0.77 <0.20*   CALCIUM mg/dL 9.6 9.2 8.7   AST U/L  --  11* 21   ALT U/L  --  17 18   ALK PHOS U/L  --  86 105*   EGFR ml/min/1.73sq m 105 110  --          BMP:  Results from last 7 days   Lab Units 2528/25  0432 25  0230   POTASSIUM mmol/L 3.9 3.7 4.2   CHLORIDE mmol/L 104 105 105   CO2 mmol/L 30 26 21   BUN mg/dL 12 13 18   CREATININE mg/dL 0.86 0.77 <0.20*   CALCIUM mg/dL 9.6 9.2 8.7       BNP:     Magnesium:   Results from last 7 days   Lab Units 25  0438 25  0432 25  0230   MAGNESIUM mg/dL 2.1 2.0 2.6       Coags:   Results  from last 7 days   Lab Units 05/28/25  0432 05/27/25  2135 05/27/25  1420 05/27/25  0809   PTT seconds 59* 47* 32 23   INR   --   --   --  0.97         Meds/Allergies   Current Facility-Administered Medications   Medication Dose Route Frequency Provider Last Rate    acetaminophen  650 mg Oral Q8H PRN Deanna Greer MD      metoprolol succinate  25 mg Oral Daily Albino Goyal MD              Medications Prior to Admission:     clobetasol (TEMOVATE) 0.05 % cream    clonazePAM (KlonoPIN) 0.25 MG disintegrating tablet    meclizine (ANTIVERT) 25 mg tablet    Assessment:  Principal Problem:    Abdominal pain  Active Problems:    A-fib (HCC)    Hematochezia

## 2025-05-29 NOTE — ANESTHESIA POSTPROCEDURE EVALUATION
Post-Op Assessment Note    CV Status:  Stable    Pain management: adequate       Mental Status:  Alert and awake   Hydration Status:  Euvolemic   PONV Controlled:  Controlled   Airway Patency:  Patent  Two or more mitigation strategies used for obstructive sleep apnea   Post Op Vitals Reviewed: Yes    No anethesia notable event occurred.    Staff: Anesthesiologist         Last Filed PACU Vitals:  Vitals Value Taken Time   Temp 97.2 °F (36.2 °C) 05/29/25 12:32   Pulse 69 05/29/25 12:53   /84 05/29/25 12:53   Resp 18 05/29/25 12:53   SpO2 94 % 05/29/25 12:53       Modified Aleyda:     Vitals Value Taken Time   Activity 2 05/29/25 12:53   Respiration 2 05/29/25 12:53   Circulation 2 05/29/25 12:53   Consciousness 2 05/29/25 12:53   Oxygen Saturation 2 05/29/25 12:53     Modified Aleyda Score: 10

## 2025-05-29 NOTE — ANESTHESIA POSTPROCEDURE EVALUATION
Post-Op Assessment Note    CV Status:  Stable    Pain management: adequate       Mental Status:  Alert and awake   Hydration Status:  Euvolemic   PONV Controlled:  Controlled   Airway Patency:  Patent     Post Op Vitals Reviewed: Yes    No anethesia notable event occurred.            Last Filed PACU Vitals:  Vitals Value Taken Time   Temp     Pulse 75    /74    Resp     SpO2 98

## 2025-05-29 NOTE — DISCHARGE INSTR - AVS FIRST PAGE
Dear Guicho Sanabria,     It was our pleasure to care for you here at [facility].  It is our hope that we were always able to exceed the expected standards for your care during your stay.  You were hospitalized due to abdominal pain, hematochezia, new onset atrial fibrillation.  You were cared for on the 2S floor under the service of Carolyn Rodriguez MD with the [facility] who covers for your primary care physician (PCP), Patricia Hunter MD, while you were hospitalized.  If you have any questions or concerns related to this hospitalization, you may contact us at .  For follow up as well as any medication refills, we recommend that you follow up with your primary care physician.  A registered nurse will reach out to you by phone within a few days after your discharge to answer any additional questions that you may have after going home.  However, at this time we provide for you here, the most important instructions / recommendations at discharge:       Notable Medication Adjustments -   Please start taking metoprolol succinate (Toprol-XL) 25 mg once daily starting 5/30/2025  Please start taking aspirin 81 mg once daily starting today  You were started on these medications by the inpatient cardiology team.  Testing Required after Discharge -   Please contact [phone] to schedule a sleep study to evaluate for obstructive sleep apnea.  A referral has been placed, however you may ask your PCP to help assist with this follow-up as well.  Important follow up information -   Please follow-up with your PCP within 1 week of hospital discharge.  You have an upcoming Cardiology appointment with GABINO Juarez on 6/13/2025.  Other Instructions -   If you develop any worsening palpitations, shortness of breath, chest pain, please call your physician and/or report to the nearest ER.  If you experience any worsening bleeding, including worsening  blood in your stool, blood in your vomit, or aggressive bruising, please call your physician and/or report to the nearest ER.    Please review this entire after visit summary as additional general instructions including medication list, appointments, activity, diet, any pertinent wound care, and other additional recommendations from your care team that may be provided for you.  Thank you!

## 2025-05-29 NOTE — ANESTHESIA PREPROCEDURE EVALUATION
Procedure:  COLONOSCOPY    Relevant Problems   CARDIO   (+) A-fib (HCC)      NEURO/PSYCH   (+) Generalized anxiety disorder      Other   (+) Class 2 obesity due to excess calories without serious comorbidity with body mass index (BMI) of 38.0 to 38.9 in adult   (+) Hematochezia        Physical Exam    Airway     Mallampati score: II  TM Distance: >3 FB  Neck ROM: full  Upper bite lip test: I  Mouth opening: >= 4 cm      Cardiovascular  Rhythm: regular, Rate: normalCardiovascular exam normal    Dental   No notable dental hx     Pulmonary  Pulmonary exam normal     Neurological    He appears awake, alert and oriented x3.      Other Findings      Anesthesia Plan  ASA Score- 2     Anesthesia Type- IV sedation with anesthesia with ASA Monitors.         Additional Monitors:     Airway Plan: natural airway.           Plan Factors-Exercise tolerance (METS): >4 METS.    Chart reviewed. EKG reviewed. Imaging results reviewed. Existing labs reviewed. Patient summary reviewed.    Patient is not a current smoker.      Obstructive sleep apnea risk education given perioperatively.        Induction-     Postoperative Plan- .   Monitoring Plan - Monitoring plan - standard ASA monitoring  Post Operative Pain Plan - non-opiod analgesics and multimodal analgesia    Perioperative Resuscitation Plan - Level 1 - Full Code.       Informed Consent- Anesthetic plan and risks discussed with patient.  I personally reviewed this patient with the CRNA. Discussed and agreed on the Anesthesia Plan with the CRNA..      NPO Status:  Vitals Value Taken Time   Date of last liquid 05/29/25 05/29/25 10:51   Time of last liquid 0700 05/29/25 10:51   Date of last solid 05/26/25 05/29/25 10:51   Time of last solid 1700 05/29/25 10:51

## 2025-05-30 ENCOUNTER — TRANSITIONAL CARE MANAGEMENT (OUTPATIENT)
Dept: FAMILY MEDICINE CLINIC | Facility: CLINIC | Age: 45
End: 2025-05-30

## 2025-05-30 NOTE — UTILIZATION REVIEW
NOTIFICATION OF ADMISSION DISCHARGE   This is a Notification of Discharge from [facility]. Please be advised that this patient has been discharge from our facility. Below you will find the admission and discharge date and time including the patient’s disposition.   UTILIZATION REVIEW CONTACT:  Utilization Review Assistants  Network Utilization Review Department  Phone: [phone] x carefully listen to the prompts. All voicemails are confidential.  Email: [email or web address]     ADMISSION INFORMATION  PRESENTATION DATE: 5/27/2025  1:44 AM  OBERVATION ADMISSION DATE: 05/27/2025 0727  INPATIENT ADMISSION DATE: N/A N/A   DISCHARGE DATE: 5/29/2025  5:24 PM   DISPOSITION:Home/Self Care    Network Utilization Review Department  ATTENTION: Please call with any questions or concerns to [phone] and carefully listen to the prompts so that you are directed to the right person. All voicemails are confidential.   For Discharge needs, contact Care Management DC Support Team at [phone] opt. 2  Send all requests for admission clinical reviews, approved or denied determinations and any other requests to dedicated fax number below belonging to the campus where the patient is receiving treatment. List of dedicated fax numbers for the Facilities:  FACILITY NAME UR FAX NUMBER   ADMISSION DENIALS (Administrative/Medical Necessity) [phone]   DISCHARGE SUPPORT TEAM ([facility]) [phone]   PARENT CHILD HEALTH (Maternity/NICU/Pediatrics) [phone]   [facility] [phone]   [facility] [phone]   [facility] [phone]   [facility] [phone]   [facility] [phone]   [facility] [phone]   [facility] [phone]   [facility] [phone]   [facility]  [facility] [phone]   [facility] [phone]   [facility] [phone]   [facility] [phone]

## 2025-06-02 LAB
ATRIAL RATE: 394 BPM
QRS AXIS: -23 DEGREES
QRSD INTERVAL: 104 MS
QT INTERVAL: 360 MS
QTC INTERVAL: 415 MS
T WAVE AXIS: 27 DEGREES
VENTRICULAR RATE: 80 BPM

## 2025-06-02 PROCEDURE — 93010 ELECTROCARDIOGRAM REPORT: CPT | Performed by: INTERNAL MEDICINE

## 2025-06-03 ENCOUNTER — RESULTS FOLLOW-UP (OUTPATIENT)
Age: 45
End: 2025-06-03

## 2025-06-03 PROCEDURE — 88305 TISSUE EXAM BY PATHOLOGIST: CPT | Performed by: PATHOLOGY

## 2025-06-10 PROBLEM — Z86.0100 HISTORY OF COLON POLYPS: Status: ACTIVE | Noted: 2025-06-10

## 2025-06-10 PROBLEM — I48.0 PAROXYSMAL ATRIAL FIBRILLATION (HCC): Status: ACTIVE | Noted: 2025-05-27

## 2025-06-10 PROBLEM — D58.2 ELEVATED HEMOGLOBIN (HCC): Status: ACTIVE | Noted: 2025-06-10

## 2025-06-10 PROBLEM — R06.83 SNORING: Status: ACTIVE | Noted: 2025-06-10

## 2025-06-10 NOTE — ASSESSMENT & PLAN NOTE
Check routine labs      Orders:    Lipid panel; Future    Hemoglobin A1C; Future    CBC and differential; Future    TSH, 3rd generation; Future

## 2025-06-10 NOTE — ASSESSMENT & PLAN NOTE
Saw  cardiology today 1 episode now resolved discharged on aspirin follow up cardiology needs sleep study due to snoring  on zio patch      Orders:    Ambulatory Referral to Sleep Medicine; Future

## 2025-06-10 NOTE — PROGRESS NOTES
Name: Guicho Sanabria      : 1980      MRN: [id number]  Encounter Provider: Patricia Hunter MD  Encounter Date: 2025   Encounter department: [facility] FAMILY MEDICINE  :  Assessment & Plan  Annual physical exam  Check routine labs      Orders:    Lipid panel; Future    Hemoglobin A1C; Future    CBC and differential; Future    TSH, 3rd generation; Future    Class 2 obesity due to excess calories without serious comorbidity with body mass index (BMI) of 38.0 to 38.9 in adult  Discussion on diet and exercise guidelines for weight loss and  health reviewed with pt            Paroxysmal atrial fibrillation (HCC)  Saw  cardiology today 1 episode now resolved discharged on aspirin follow up cardiology needs sleep study due to snoring  on zio patch      Orders:    Ambulatory Referral to Sleep Medicine; Future    Elevated hemoglobin (HCC)  Recheck cbc         History of colon polyps  Colonoscopy  repeat 7 yrs          Snoring  Sleep study    Orders:    Ambulatory Referral to Sleep Medicine; Future           History of Present Illness   Pt here for annual physical  post hospital  visit for atrial fibrillation now resolved Pt is here for interval visit and evaluation of multiple medical problems, review of medications, labs, Health Maintenance and any recent specialty consults        Review of Systems   Constitutional:  Negative for appetite change, chills, fatigue and fever.   Respiratory:  Negative for cough, chest tightness and shortness of breath.    Cardiovascular:  Negative for chest pain, palpitations and leg swelling.   Gastrointestinal:  Negative for abdominal pain, constipation, diarrhea, nausea and vomiting.   Genitourinary:  Negative for difficulty urinating and frequency.   Musculoskeletal:  Negative for arthralgias, back pain, gait problem and neck pain.   Skin:  Negative for rash.   Neurological:  Negative for dizziness, weakness, light-headedness, numbness and headaches.  "  Hematological:  Does not bruise/bleed easily.   Psychiatric/Behavioral:  Negative for dysphoric mood and sleep disturbance. The patient is not nervous/anxious.        Objective   /78 (BP Location: Left arm, Patient Position: Sitting, Cuff Size: Standard)   Pulse 67   Temp 98.6 °F (37 °C) (Tympanic)   Resp 16   Ht 5' 8\" (1.727 m)   Wt 111 kg (244 lb)   SpO2 95%   BMI 37.10 kg/m²      Physical Exam  Vitals and nursing note reviewed.   Constitutional:       General: He is not in acute distress.     Appearance: Normal appearance. He is well-developed. He is obese.   HENT:      Right Ear: Tympanic membrane and ear canal normal.      Left Ear: Tympanic membrane and ear canal normal.      Mouth/Throat:      Mouth: Mucous membranes are moist.     Eyes:      Extraocular Movements: Extraocular movements intact.      Conjunctiva/sclera: Conjunctivae normal.      Pupils: Pupils are equal, round, and reactive to light.     Neck:      Thyroid: No thyromegaly.      Vascular: No carotid bruit.     Cardiovascular:      Rate and Rhythm: Normal rate and regular rhythm.      Pulses: Normal pulses.      Heart sounds: Normal heart sounds. No murmur heard.  Pulmonary:      Effort: Pulmonary effort is normal. No respiratory distress.      Breath sounds: Normal breath sounds. No wheezing or rales.   Abdominal:      General: Bowel sounds are normal. There is no distension.      Palpations: Abdomen is soft. There is no mass.      Tenderness: There is no abdominal tenderness.      Hernia: No hernia is present.     Musculoskeletal:      Cervical back: Normal range of motion and neck supple.      Right lower leg: No edema.      Left lower leg: No edema.   Lymphadenopathy:      Cervical: No cervical adenopathy.     Skin:     General: Skin is warm and dry.      Capillary Refill: Capillary refill takes less than 2 seconds.      Findings: No rash.      Comments: No abnormal moles     Neurological:      General: No focal deficit " present.      Mental Status: He is alert and oriented to person, place, and time.      Cranial Nerves: No cranial nerve deficit.      Sensory: No sensory deficit.      Motor: No weakness.      Coordination: Coordination normal.      Gait: Gait normal.      Deep Tendon Reflexes: Reflexes normal.     Psychiatric:         Mood and Affect: Mood normal.         Behavior: Behavior normal.         Thought Content: Thought content normal.

## 2025-06-13 ENCOUNTER — OFFICE VISIT (OUTPATIENT)
Dept: CARDIOLOGY CLINIC | Facility: CLINIC | Age: 45
End: 2025-06-13
Payer: COMMERCIAL

## 2025-06-13 ENCOUNTER — OFFICE VISIT (OUTPATIENT)
Dept: FAMILY MEDICINE CLINIC | Facility: CLINIC | Age: 45
End: 2025-06-13
Payer: COMMERCIAL

## 2025-06-13 VITALS
HEIGHT: 68 IN | BODY MASS INDEX: 36.98 KG/M2 | DIASTOLIC BLOOD PRESSURE: 78 MMHG | SYSTOLIC BLOOD PRESSURE: 118 MMHG | HEART RATE: 67 BPM | TEMPERATURE: 98.6 F | OXYGEN SATURATION: 95 % | RESPIRATION RATE: 16 BRPM | WEIGHT: 244 LBS

## 2025-06-13 VITALS
SYSTOLIC BLOOD PRESSURE: 124 MMHG | BODY MASS INDEX: 36.64 KG/M2 | OXYGEN SATURATION: 97 % | DIASTOLIC BLOOD PRESSURE: 78 MMHG | WEIGHT: 241 LBS | HEART RATE: 63 BPM

## 2025-06-13 DIAGNOSIS — R06.83 SNORING: ICD-10-CM

## 2025-06-13 DIAGNOSIS — I48.0 PAROXYSMAL ATRIAL FIBRILLATION (HCC): ICD-10-CM

## 2025-06-13 DIAGNOSIS — Z86.0100 HISTORY OF COLON POLYPS: ICD-10-CM

## 2025-06-13 DIAGNOSIS — E66.812 CLASS 2 OBESITY DUE TO EXCESS CALORIES WITHOUT SERIOUS COMORBIDITY WITH BODY MASS INDEX (BMI) OF 38.0 TO 38.9 IN ADULT: ICD-10-CM

## 2025-06-13 DIAGNOSIS — E66.09 CLASS 2 OBESITY DUE TO EXCESS CALORIES WITHOUT SERIOUS COMORBIDITY WITH BODY MASS INDEX (BMI) OF 38.0 TO 38.9 IN ADULT: ICD-10-CM

## 2025-06-13 DIAGNOSIS — Z00.00 ANNUAL PHYSICAL EXAM: Primary | ICD-10-CM

## 2025-06-13 DIAGNOSIS — D58.2 ELEVATED HEMOGLOBIN (HCC): ICD-10-CM

## 2025-06-13 PROCEDURE — 99214 OFFICE O/P EST MOD 30 MIN: CPT

## 2025-06-13 PROCEDURE — 99214 OFFICE O/P EST MOD 30 MIN: CPT | Performed by: FAMILY MEDICINE

## 2025-06-13 PROCEDURE — 93000 ELECTROCARDIOGRAM COMPLETE: CPT

## 2025-06-13 PROCEDURE — 99396 PREV VISIT EST AGE 40-64: CPT | Performed by: FAMILY MEDICINE

## 2025-06-13 RX ORDER — ASPIRIN 81 MG/1
81 TABLET, CHEWABLE ORAL DAILY
Qty: 90 TABLET | Refills: 3 | Status: SHIPPED | OUTPATIENT
Start: 2025-06-13

## 2025-06-13 RX ORDER — METOPROLOL SUCCINATE 25 MG/1
25 TABLET, EXTENDED RELEASE ORAL DAILY
Qty: 90 TABLET | Refills: 3 | Status: SHIPPED | OUTPATIENT
Start: 2025-06-13

## 2025-06-13 NOTE — PROGRESS NOTES
Guicho Sanabria  1980  [id number]  [facility]  [address] [facility]  [address]  [facility] [address] [phone]    1. Paroxysmal atrial fibrillation (HCC)  POCT ECG    Zio Monitor    Zio Monitor    metoprolol succinate (TOPROL-XL) 25 mg 24 hr tablet    aspirin 81 mg chewable tablet      2. Snoring            Summary/Discussion:  Newly diagnosed paroxysmal atrial fibrillation  - incidentally found to be in atrial fibrillation on EKG during presentation to ED on 5/27/2025. Cardiology was consulted   - EKG today demonstrating sinus rhythm with premature supraventricular complexes  - echo (5/28/2025): EF 65%, function, mildly dilated at 3.7 cm, ascending aorta mildly dilated at 3.8 cm-- technically difficult study  - [id number] = 0   not on anticoagulation   - continue low dose aspirin  - continue metoprolol 25 mg daily  - sleep medicine evaluation to assess for underlying sleep apnea  - asymptomatic   - recommend 2 week zio to assess for recurrence/burden    Dyslipidemia:  - Lipid Profile:    Latest Reference Range & Units 02/05/24 08:05   Cholesterol See Comment mg/dL 168   Triglycerides See Comment mg/dL 273 (H)   HDL >=40 mg/dL 32 (L)   Non-HDL Cholesterol mg/dl 136   LDL Calculated 0 - 100 mg/dL 81   - historically not on any lipid-lowering therapy   - PCP follows   - encouraged low cholesterol, mediterranean diet, and annual lipid follow up     Interval History: Guicho Sanabira is a 45 y.o. year old male with history mentioned in problem list who presents to the office today for a hospital follow up.     Since his recent hospital admission he has been overall feeling well from a cardiac standpoint. He denies any chest pain/pressure/discomfort or shortness of breath. He denies lower extremity edema, orthopnea, and PND. He denies lightheadedness, dizziness, and syncope. He denies palpitations.      He will RTO in 6 months with Dr. Goyal or sooner if necessary. He will  "call with any concerns.         Medical Problems       Problem List       Annual physical exam    Rash    Left knee pain    Generalized anxiety disorder    Class 2 obesity due to excess calories without serious comorbidity with body mass index (BMI) of 38.0 to 38.9 in adult    Paroxysmal atrial fibrillation (HCC)    Elevated hemoglobin (HCC)    History of colon polyps    Snoring        Past Medical History[1]  Social History     Socioeconomic History    Marital status: /Civil Union     Spouse name: Not on file    Number of children: Not on file    Years of education: Not on file    Highest education level: Not on file   Occupational History    Not on file   Tobacco Use    Smoking status: Never    Smokeless tobacco: Never   Vaping Use    Vaping status: Never Used   Substance and Sexual Activity    Alcohol use: Not Currently     Comment: socially    Drug use: No    Sexual activity: Not Currently   Other Topics Concern    Not on file   Social History Narrative    ** Merged History Encounter **          Social Drivers of Health     Financial Resource Strain: Not on file   Food Insecurity: Not on file   Transportation Needs: Not on file   Physical Activity: Not on file   Stress: Not on file   Social Connections: Not on file   Intimate Partner Violence: Not on file   Housing Stability: Not on file      Family History[2]  Past Surgical History[3]  Current Medications[4]  No Known Allergies    Labs:     Chemistry        Component Value Date/Time    K 3.9 05/29/2025 0438     05/29/2025 0438    CO2 30 05/29/2025 0438    BUN 12 05/29/2025 0438    CREATININE 0.86 05/29/2025 0438        Component Value Date/Time    CALCIUM 9.6 05/29/2025 0438    ALKPHOS 86 05/28/2025 0432    AST 11 (L) 05/28/2025 0432    ALT 17 05/28/2025 0432            No results found for: \"CHOL\"  Lab Results   Component Value Date    HDL 32 (L) 02/05/2024    HDL 37 (L) 04/22/2022     Lab Results   Component Value Date    LDLCALC 81 02/05/2024    " "LDLCALC 112 (H) 04/22/2022     Lab Results   Component Value Date    TRIG 273 (H) 02/05/2024    TRIG 188 (H) 04/22/2022     No results found for: \"CHOLHDL\"    Imaging: Colonoscopy  Addendum Date: 6/3/2025  Addendum: [facility] [address] [facility] [address] DATE OF SERVICE: 5/29/25 PHYSICIAN(S): Attending: Carmelita Valenzuela MD Fellow: No Staff Documented INDICATION: Hematochezia POST-OP DIAGNOSIS: See the impression below. HISTORY: Prior colonoscopy: No prior colonoscopy. BOWEL PREPARATION: Golytely/Colyte/Trilyte PREPROCEDURE: Informed consent was obtained for the procedure, including sedation. Risks including but not limited to bleeding, infection, perforation, adverse drug reaction and aspiration were explained in detail. Also explained about less than 100% sensitivity with the exam and other alternatives. The patient was placed in the left lateral decubitus position. Procedure: Colonoscopy DETAILS OF PROCEDURE: Patient was taken to the procedure room where a time out was performed to confirm correct patient and correct procedure. The patient underwent monitored anesthesia care, which was administered by an anesthesia professional. The patient's blood pressure, ECG, ETCO2, heart rate, level of consciousness, oxygen and respirations were monitored throughout the procedure. A digital rectal exam was performed. The scope was introduced through the anus and advanced to the cecum. Retroflexion was performed in the rectum. The quality of bowel preparation was evaluated using the [place] Bowel Preparation Scale with scores of: right colon = 2, transverse colon = 2, left colon = 2. The total BBPS score was 6. Bowel prep was adequate. The patient experienced no blood loss. The procedure was not difficult. The patient tolerated the procedure well. There were no apparent adverse events. ANESTHESIA INFORMATION: ASA: II Anesthesia Type: IV Sedation with Anesthesia MEDICATIONS: simethicone " (MYLICON) 40 mg in sterile water 2,503 mL 40 mg (Totals for administrations occurring from 1200 to 1230 on 05/29/25) FINDINGS: One sessile polyp measuring 5-9 mm; performed cold snare with complete en bloc removal and retrieved specimen EVENTS: Procedure Events Event Event Time ENDO CECUM REACHED 5/29/2025 12:15 PM ENDO SCOPE OUT TIME 5/29/2025 12:28 PM SPECIMENS: ID Type Source Tests Collected by Time Destination 1 : cold snare sigmoid polyp Tissue Polyp, Colorectal TISSUE EXAM Carmelita Valenzuela MD 5/29/2025 12:25 PM  EQUIPMENT: Colonoscope - IMPRESSION: Polyp was removed with cold snare RECOMMENDATION: Await pathology results Repeat colonoscopy in 7 years, due: 5/27/2032 Personal history of colon polyps  Path reviewed 6/3    Carmelita Valenzuela MD     Result Date: 6/3/2025  Narrative: Table formatting from the original result was not included. [facility] [address] [facility] [address] DATE OF SERVICE: 5/29/25 PHYSICIAN(S): Attending: Carmelita Valenzuela MD Fellow: No Staff Documented INDICATION: Hematochezia POST-OP DIAGNOSIS: See the impression below. HISTORY: Prior colonoscopy: No prior colonoscopy. BOWEL PREPARATION: Golytely/Colyte/Trilyte PREPROCEDURE: Informed consent was obtained for the procedure, including sedation. Risks including but not limited to bleeding, infection, perforation, adverse drug reaction and aspiration were explained in detail. Also explained about less than 100% sensitivity with the exam and other alternatives. The patient was placed in the left lateral decubitus position. Procedure: Colonoscopy DETAILS OF PROCEDURE: Patient was taken to the procedure room where a time out was performed to confirm correct patient and correct procedure. The patient underwent monitored anesthesia care, which was administered by an anesthesia professional. The patient's blood pressure, ECG, ETCO2, heart rate, level of consciousness, oxygen and respirations were monitored throughout the  procedure. A digital rectal exam was performed. The scope was introduced through the anus and advanced to the cecum. Retroflexion was performed in the rectum. The quality of bowel preparation was evaluated using the [place] Bowel Preparation Scale with scores of: right colon = 2, transverse colon = 2, left colon = 2. The total BBPS score was 6. Bowel prep was adequate. The patient experienced no blood loss. The procedure was not difficult. The patient tolerated the procedure well. There were no apparent adverse events. ANESTHESIA INFORMATION: ASA: II Anesthesia Type: IV Sedation with Anesthesia MEDICATIONS: simethicone (MYLICON) 40 mg in sterile water 2,503 mL 40 mg (Totals for administrations occurring from 1200 to 1230 on 05/29/25) FINDINGS: One sessile polyp measuring 5-9 mm; performed cold snare with complete en bloc removal and retrieved specimen EVENTS: Procedure Events Event Event Time ENDO CECUM REACHED 5/29/2025 12:15 PM ENDO SCOPE OUT TIME 5/29/2025 12:28 PM SPECIMENS: ID Type Source Tests Collected by Time Destination 1 : cold snare sigmoid polyp Tissue Polyp, Colorectal TISSUE EXAM Carmelita Valenzuela MD 5/29/2025 12:25 PM  EQUIPMENT: Colonoscope -     Impression: Polyp was removed with cold snare RECOMMENDATION: Await pathology results Repeat colonoscopy in 7 years, due: 5/27/2032 Personal history of colon polyps    Carmelita Valenzuela MD     Echo complete w/ contrast if indicated  Result Date: 5/28/2025  Narrative:   Left Ventricle: Left ventricular cavity size is normal. Wall thickness is normal. The left ventricular ejection fraction is 65%. Systolic function is normal. Although no diagnostic regional wall motion abnormality was identified, this possibility cannot be completely excluded on the basis of this study. Diastolic function is normal.   Right Ventricle: Systolic function is grossly normal.   Aorta: The aortic root is mildly dilated. The ascending aorta is mildly dilated. The aortic root is 3.70 cm. The  ascending aorta is 3.8 cm.   Technically difficult study.     CTA abdomen pelvis w wo contrast  Result Date: 5/27/2025  Narrative: CT ANGIOGRAM OF THE ABDOMEN AND PELVIS WITH AND WITHOUT IV CONTRAST INDICATION: Eval generalized abd pain, worse in the RLQ, Hx of appendectomy, now has hematochezia, new onset afib. COMPARISON: CT 4/17/2017 TECHNIQUE: CT angiogram examination of the abdomen and pelvis was performed according to standard protocol. This examination, like all CT scans performed in the [facility], was performed utilizing techniques to minimize radiation dose exposure, including the use of iterative reconstruction and automated exposure control. Contrast as well as noncontrast images were obtained. Rad dose 2278 mGy-cm. IV Contrast: 100 mL of iohexol (OMNIPAQUE) Enteric Contrast: Not administered. FINDINGS: VASCULAR STRUCTURES: No aortic dissection, or aneurysm. No evidence of atherosclerotic disease. OTHER FINDINGS ABDOMEN LOWER CHEST: No clinically significant abnormality in the visualized lower chest. LIVER/BILIARY TREE: Unremarkable. GALLBLADDER: No calcified gallstones. No pericholecystic inflammatory change. SPLEEN: Unremarkable. PANCREAS: Unremarkable. ADRENAL GLANDS: Unremarkable. KIDNEYS/URETERS: Unremarkable. No hydronephrosis. STOMACH AND BOWEL: Colonic diverticulosis without findings of acute diverticulitis. APPENDIX: No findings to suggest appendicitis. ABDOMINOPELVIC CAVITY: No ascites. No pneumoperitoneum. No lymphadenopathy. PELVIS REPRODUCTIVE ORGANS: Unremarkable for patient's age. URINARY BLADDER: Unremarkable. ABDOMINAL WALL/INGUINAL REGIONS: Unremarkable. BONES: No acute fracture or suspicious osseous lesion.     Impression: 1.  No acute aortic syndrome 2.  Diverticulosis without diverticulitis Workstation performed: [id number]       ECG:  sinus rhythm with premature supraventricular complexes    Review of Systems   All other systems reviewed and are  negative.      Vitals:    06/13/25 0909   BP: 124/78   Pulse: 63   SpO2: 97%     Vitals:    06/13/25 0909   Weight: 109 kg (241 lb)         Body mass index is 36.64 kg/m².    Physical Exam  Vitals and nursing note reviewed.   Constitutional:       General: He is not in acute distress.     Appearance: Normal appearance.   HENT:      Head: Normocephalic.      Nose: Nose normal.      Mouth/Throat:      Mouth: Mucous membranes are moist.      Pharynx: Oropharynx is clear.     Cardiovascular:      Rate and Rhythm: Normal rate.      Pulses: Normal pulses.      Heart sounds: Normal heart sounds. No murmur heard.     Comments: ectopy  Pulmonary:      Effort: Pulmonary effort is normal.      Breath sounds: Normal breath sounds.     Musculoskeletal:      Cervical back: Normal range of motion.      Right lower leg: No edema.      Left lower leg: No edema.     Skin:     General: Skin is warm and dry.     Neurological:      Mental Status: He is alert and oriented to person, place, and time.     Psychiatric:         Mood and Affect: Mood normal.         Behavior: Behavior normal.               [1] No past medical history on file.  [2]   Family History  Problem Relation Name Age of Onset    No Known Problems Mother      No Known Problems Father     [3]   Past Surgical History:  Procedure Laterality Date    APPENDECTOMY      NM LAPAROSCOPIC APPENDECTOMY N/A 04/17/2017    Procedure: APPENDECTOMY LAPAROSCOPIC;  Surgeon: Miguel Monaco DO;  Location: [facility];  Service: General   [4]   Current Outpatient Medications:     aspirin 81 mg chewable tablet, Chew 1 tablet (81 mg total) daily, Disp: 90 tablet, Rfl: 3    metoprolol succinate (TOPROL-XL) 25 mg 24 hr tablet, Take 1 tablet (25 mg total) by mouth daily, Disp: 90 tablet, Rfl: 3    clobetasol (TEMOVATE) 0.05 % cream, , Disp: , Rfl:     clonazePAM (KlonoPIN) 0.25 MG disintegrating tablet, Take 1 tablet (0.25 mg total) by mouth daily as needed for seizures (Patient not taking:  Reported on 6/13/2025), Disp: 20 tablet, Rfl: 0

## 2025-06-15 ENCOUNTER — APPOINTMENT (OUTPATIENT)
Dept: LAB | Facility: CLINIC | Age: 45
End: 2025-06-15
Payer: COMMERCIAL

## 2025-06-15 DIAGNOSIS — Z00.00 ANNUAL PHYSICAL EXAM: ICD-10-CM

## 2025-06-15 LAB
BASOPHILS # BLD AUTO: 0.06 THOUSANDS/ÂΜL (ref 0–0.1)
BASOPHILS NFR BLD AUTO: 1 % (ref 0–1)
CHOLEST SERPL-MCNC: 170 MG/DL (ref ?–200)
EOSINOPHIL # BLD AUTO: 0.16 THOUSAND/ÂΜL (ref 0–0.61)
EOSINOPHIL NFR BLD AUTO: 2 % (ref 0–6)
ERYTHROCYTE [DISTWIDTH] IN BLOOD BY AUTOMATED COUNT: 13 % (ref 11.6–15.1)
EST. AVERAGE GLUCOSE BLD GHB EST-MCNC: 123 MG/DL
HBA1C MFR BLD: 5.9 %
HCT VFR BLD AUTO: 51 % (ref 36.5–49.3)
HDLC SERPL-MCNC: 31 MG/DL
HGB BLD-MCNC: 17.8 G/DL (ref 12–17)
IMM GRANULOCYTES # BLD AUTO: 0.01 THOUSAND/UL (ref 0–0.2)
IMM GRANULOCYTES NFR BLD AUTO: 0 % (ref 0–2)
LDLC SERPL CALC-MCNC: 68 MG/DL (ref 0–100)
LYMPHOCYTES # BLD AUTO: 3.12 THOUSANDS/ÂΜL (ref 0.6–4.47)
LYMPHOCYTES NFR BLD AUTO: 45 % (ref 14–44)
MCH RBC QN AUTO: 29.3 PG (ref 26.8–34.3)
MCHC RBC AUTO-ENTMCNC: 34.9 G/DL (ref 31.4–37.4)
MCV RBC AUTO: 84 FL (ref 82–98)
MONOCYTES # BLD AUTO: 0.39 THOUSAND/ÂΜL (ref 0.17–1.22)
MONOCYTES NFR BLD AUTO: 6 % (ref 4–12)
NEUTROPHILS # BLD AUTO: 3.17 THOUSANDS/ÂΜL (ref 1.85–7.62)
NEUTS SEG NFR BLD AUTO: 46 % (ref 43–75)
NONHDLC SERPL-MCNC: 139 MG/DL
NRBC BLD AUTO-RTO: 0 /100 WBCS
PLATELET # BLD AUTO: 191 THOUSANDS/UL (ref 149–390)
PMV BLD AUTO: 11.6 FL (ref 8.9–12.7)
RBC # BLD AUTO: 6.08 MILLION/UL (ref 3.88–5.62)
TRIGL SERPL-MCNC: 357 MG/DL (ref ?–150)
TSH SERPL DL<=0.05 MIU/L-ACNC: 2.37 UIU/ML (ref 0.45–4.5)
WBC # BLD AUTO: 6.91 THOUSAND/UL (ref 4.31–10.16)

## 2025-06-15 PROCEDURE — 84443 ASSAY THYROID STIM HORMONE: CPT

## 2025-06-15 PROCEDURE — 83036 HEMOGLOBIN GLYCOSYLATED A1C: CPT

## 2025-06-15 PROCEDURE — 80061 LIPID PANEL: CPT

## 2025-06-15 PROCEDURE — 36415 COLL VENOUS BLD VENIPUNCTURE: CPT

## 2025-06-15 PROCEDURE — 85025 COMPLETE CBC W/AUTO DIFF WBC: CPT

## 2025-06-16 ENCOUNTER — RESULTS FOLLOW-UP (OUTPATIENT)
Dept: FAMILY MEDICINE CLINIC | Facility: CLINIC | Age: 45
End: 2025-06-16

## 2025-06-16 DIAGNOSIS — E78.5 DYSLIPIDEMIA: Primary | ICD-10-CM

## 2025-06-16 DIAGNOSIS — D58.2 ELEVATED HEMOGLOBIN (HCC): ICD-10-CM

## 2025-07-09 ENCOUNTER — TELEPHONE (OUTPATIENT)
Dept: CARDIOLOGY CLINIC | Facility: CLINIC | Age: 45
End: 2025-07-09

## 2025-07-09 NOTE — TELEPHONE ENCOUNTER
We recv'd call from Odalys regarding his zio monitor he wore- Report was sent to Dr. Goyal for review

## 2025-07-16 ENCOUNTER — APPOINTMENT (OUTPATIENT)
Dept: LAB | Facility: CLINIC | Age: 45
End: 2025-07-16
Attending: FAMILY MEDICINE
Payer: COMMERCIAL

## 2025-07-16 DIAGNOSIS — D58.2 ELEVATED HEMOGLOBIN (HCC): ICD-10-CM

## 2025-07-16 LAB
BASOPHILS # BLD AUTO: 0.03 THOUSANDS/ÂΜL (ref 0–0.1)
BASOPHILS NFR BLD AUTO: 1 % (ref 0–1)
EOSINOPHIL # BLD AUTO: 0.16 THOUSAND/ÂΜL (ref 0–0.61)
EOSINOPHIL NFR BLD AUTO: 3 % (ref 0–6)
ERYTHROCYTE [DISTWIDTH] IN BLOOD BY AUTOMATED COUNT: 12.7 % (ref 11.6–15.1)
HCT VFR BLD AUTO: 46.2 % (ref 36.5–49.3)
HGB BLD-MCNC: 15.7 G/DL (ref 12–17)
IMM GRANULOCYTES # BLD AUTO: 0.01 THOUSAND/UL (ref 0–0.2)
IMM GRANULOCYTES NFR BLD AUTO: 0 % (ref 0–2)
LYMPHOCYTES # BLD AUTO: 2.4 THOUSANDS/ÂΜL (ref 0.6–4.47)
LYMPHOCYTES NFR BLD AUTO: 44 % (ref 14–44)
MCH RBC QN AUTO: 28.5 PG (ref 26.8–34.3)
MCHC RBC AUTO-ENTMCNC: 34 G/DL (ref 31.4–37.4)
MCV RBC AUTO: 84 FL (ref 82–98)
MONOCYTES # BLD AUTO: 0.29 THOUSAND/ÂΜL (ref 0.17–1.22)
MONOCYTES NFR BLD AUTO: 5 % (ref 4–12)
NEUTROPHILS # BLD AUTO: 2.59 THOUSANDS/ÂΜL (ref 1.85–7.62)
NEUTS SEG NFR BLD AUTO: 47 % (ref 43–75)
NRBC BLD AUTO-RTO: 0 /100 WBCS
PLATELET # BLD AUTO: 180 THOUSANDS/UL (ref 149–390)
PMV BLD AUTO: 10.9 FL (ref 8.9–12.7)
RBC # BLD AUTO: 5.5 MILLION/UL (ref 3.88–5.62)
WBC # BLD AUTO: 5.48 THOUSAND/UL (ref 4.31–10.16)

## 2025-07-16 PROCEDURE — 36415 COLL VENOUS BLD VENIPUNCTURE: CPT

## 2025-07-16 PROCEDURE — 85025 COMPLETE CBC W/AUTO DIFF WBC: CPT

## (undated) DEVICE — SUT MONOCRYL 4-0 PS-2 27 IN Y426H

## (undated) DEVICE — ADHESIVE SKN CLSR HISTOACRYL FLEX 0.5ML LF

## (undated) DEVICE — SUT VICRYL 0 UR-6 27 IN J603H

## (undated) DEVICE — SCD SEQUENTIAL COMPRESSION COMFORT SLEEVE MEDIUM KNEE LENGTH: Brand: KENDALL SCD

## (undated) DEVICE — ENDOPOUCH RETRIEVER SPECIMEN RETRIEVAL BAGS: Brand: ENDOPOUCH RETRIEVER

## (undated) DEVICE — REM POLYHESIVE ADULT PATIENT RETURN ELECTRODE: Brand: VALLEYLAB

## (undated) DEVICE — NEEDLE 22 G X 1 1/2 SAFETY

## (undated) DEVICE — VIAL DECANTER

## (undated) DEVICE — ENDOPATH XCEL UNIVERSAL TROCAR STABLILITY SLEEVES: Brand: ENDOPATH XCEL

## (undated) DEVICE — IRRIG ENDO FLO TUBING

## (undated) DEVICE — ENDOPATH XCEL BLADELESS TROCARS WITH STABILITY SLEEVES: Brand: ENDOPATH XCEL

## (undated) DEVICE — LIGHT HANDLE COVER SLEEVE DISP BLUE STELLAR

## (undated) DEVICE — TOWEL SET X-RAY

## (undated) DEVICE — ALLENTOWN LAP CHOLE APP PACK: Brand: CARDINAL HEALTH

## (undated) DEVICE — HARMONIC ACE 5MM DIAMETER SHEARS 36CM SHAFT LENGTH + ADAPTIVE TISSUE TECHNOLOGY FOR USE WITH GENERATOR G11: Brand: HARMONIC ACE

## (undated) DEVICE — ETS45 RELOAD STANDARD 45MM: Brand: ENDOPATH

## (undated) DEVICE — INTENDED FOR TISSUE SEPARATION, AND OTHER PROCEDURES THAT REQUIRE A SHARP SURGICAL BLADE TO PUNCTURE OR CUT.: Brand: BARD-PARKER SAFETY BLADES SIZE 11, STERILE

## (undated) DEVICE — ENDOPATH ETS-FLEX45 ARTICULATING ENDOSCOPIC LINEAR CUTTER, NO RELOAD: Brand: ENDOPATH

## (undated) DEVICE — CHLORAPREP HI-LITE 26ML ORANGE

## (undated) DEVICE — GLOVE SRG BIOGEL ORTHOPEDIC 8

## (undated) DEVICE — TRAY FOLEY 16FR URIMETER SURESTEP